# Patient Record
Sex: MALE | Race: ASIAN | NOT HISPANIC OR LATINO
[De-identification: names, ages, dates, MRNs, and addresses within clinical notes are randomized per-mention and may not be internally consistent; named-entity substitution may affect disease eponyms.]

---

## 2017-11-01 ENCOUNTER — TRANSCRIPTION ENCOUNTER (OUTPATIENT)
Age: 62
End: 2017-11-01

## 2017-11-01 ENCOUNTER — INPATIENT (INPATIENT)
Facility: HOSPITAL | Age: 62
LOS: 2 days | Discharge: HOME | End: 2017-11-04
Attending: UROLOGY | Admitting: UROLOGY
Payer: COMMERCIAL

## 2017-11-01 VITALS
OXYGEN SATURATION: 97 % | HEART RATE: 108 BPM | RESPIRATION RATE: 18 BRPM | SYSTOLIC BLOOD PRESSURE: 110 MMHG | TEMPERATURE: 103 F | WEIGHT: 162.26 LBS | DIASTOLIC BLOOD PRESSURE: 58 MMHG

## 2017-11-01 DIAGNOSIS — Z98.890 OTHER SPECIFIED POSTPROCEDURAL STATES: Chronic | ICD-10-CM

## 2017-11-01 DIAGNOSIS — A41.9 SEPSIS, UNSPECIFIED ORGANISM: ICD-10-CM

## 2017-11-01 DIAGNOSIS — Z90.49 ACQUIRED ABSENCE OF OTHER SPECIFIED PARTS OF DIGESTIVE TRACT: Chronic | ICD-10-CM

## 2017-11-01 LAB
ALBUMIN SERPL ELPH-MCNC: 3.3 G/DL — SIGNIFICANT CHANGE UP (ref 3.3–5)
ALP SERPL-CCNC: 96 U/L — SIGNIFICANT CHANGE UP (ref 40–120)
ALT FLD-CCNC: 23 U/L — SIGNIFICANT CHANGE UP (ref 4–41)
APPEARANCE UR: SIGNIFICANT CHANGE UP
APTT BLD: 26.1 SEC — LOW (ref 27.5–37.4)
AST SERPL-CCNC: 19 U/L — SIGNIFICANT CHANGE UP (ref 4–40)
BACTERIA # UR AUTO: SIGNIFICANT CHANGE UP
BASE EXCESS BLDV CALC-SCNC: -2.4 MMOL/L — SIGNIFICANT CHANGE UP
BASE EXCESS BLDV CALC-SCNC: -3.4 MMOL/L — SIGNIFICANT CHANGE UP
BASOPHILS # BLD AUTO: 0.02 K/UL — SIGNIFICANT CHANGE UP (ref 0–0.2)
BASOPHILS NFR BLD AUTO: 0.2 % — SIGNIFICANT CHANGE UP (ref 0–2)
BASOPHILS NFR SPEC: 0 % — SIGNIFICANT CHANGE UP (ref 0–2)
BILIRUB SERPL-MCNC: 0.4 MG/DL — SIGNIFICANT CHANGE UP (ref 0.2–1.2)
BILIRUB UR-MCNC: NEGATIVE — SIGNIFICANT CHANGE UP
BLD GP AB SCN SERPL QL: NEGATIVE — SIGNIFICANT CHANGE UP
BLOOD GAS VENOUS - CREATININE: 1.72 MG/DL — HIGH (ref 0.5–1.3)
BLOOD GAS VENOUS - CREATININE: 1.73 MG/DL — HIGH (ref 0.5–1.3)
BLOOD UR QL VISUAL: HIGH
BUN SERPL-MCNC: 40 MG/DL — HIGH (ref 7–23)
CALCIUM SERPL-MCNC: 8.5 MG/DL — SIGNIFICANT CHANGE UP (ref 8.4–10.5)
CHLORIDE BLDV-SCNC: 108 MMOL/L — SIGNIFICANT CHANGE UP (ref 96–108)
CHLORIDE BLDV-SCNC: 111 MMOL/L — HIGH (ref 96–108)
CHLORIDE SERPL-SCNC: 103 MMOL/L — SIGNIFICANT CHANGE UP (ref 98–107)
CO2 SERPL-SCNC: 20 MMOL/L — LOW (ref 22–31)
COLOR SPEC: YELLOW — SIGNIFICANT CHANGE UP
CREAT SERPL-MCNC: 1.77 MG/DL — HIGH (ref 0.5–1.3)
EOSINOPHIL # BLD AUTO: 0 K/UL — SIGNIFICANT CHANGE UP (ref 0–0.5)
EOSINOPHIL NFR BLD AUTO: 0 % — SIGNIFICANT CHANGE UP (ref 0–6)
EOSINOPHIL NFR FLD: 0 % — SIGNIFICANT CHANGE UP (ref 0–6)
GAS PNL BLDV: 136 MMOL/L — SIGNIFICANT CHANGE UP (ref 136–146)
GAS PNL BLDV: 136 MMOL/L — SIGNIFICANT CHANGE UP (ref 136–146)
GLUCOSE BLDV-MCNC: 101 — HIGH (ref 70–99)
GLUCOSE BLDV-MCNC: 134 — HIGH (ref 70–99)
GLUCOSE SERPL-MCNC: 126 MG/DL — HIGH (ref 70–99)
GLUCOSE UR-MCNC: 100 — SIGNIFICANT CHANGE UP
HCO3 BLDV-SCNC: 21 MMOL/L — SIGNIFICANT CHANGE UP (ref 20–27)
HCO3 BLDV-SCNC: 23 MMOL/L — SIGNIFICANT CHANGE UP (ref 20–27)
HCT VFR BLD CALC: 39.2 % — SIGNIFICANT CHANGE UP (ref 39–50)
HCT VFR BLDV CALC: 35.7 % — LOW (ref 39–51)
HCT VFR BLDV CALC: 41.8 % — SIGNIFICANT CHANGE UP (ref 39–51)
HGB BLD-MCNC: 13.4 G/DL — SIGNIFICANT CHANGE UP (ref 13–17)
HGB BLDV-MCNC: 11.6 G/DL — LOW (ref 13–17)
HGB BLDV-MCNC: 13.6 G/DL — SIGNIFICANT CHANGE UP (ref 13–17)
IMM GRANULOCYTES # BLD AUTO: 0.1 # — SIGNIFICANT CHANGE UP
IMM GRANULOCYTES NFR BLD AUTO: 1.2 % — SIGNIFICANT CHANGE UP (ref 0–1.5)
INR BLD: 1.2 — HIGH (ref 0.88–1.17)
KETONES UR-MCNC: NEGATIVE — SIGNIFICANT CHANGE UP
LACTATE BLDV-MCNC: 2.4 MMOL/L — HIGH (ref 0.5–2)
LACTATE BLDV-MCNC: 2.8 MMOL/L — HIGH (ref 0.5–2)
LEUKOCYTE ESTERASE UR-ACNC: HIGH
LYMPHOCYTES # BLD AUTO: 0.27 K/UL — LOW (ref 1–3.3)
LYMPHOCYTES # BLD AUTO: 3.4 % — LOW (ref 13–44)
LYMPHOCYTES NFR SPEC AUTO: 3.5 % — LOW (ref 13–44)
MCHC RBC-ENTMCNC: 30.7 PG — SIGNIFICANT CHANGE UP (ref 27–34)
MCHC RBC-ENTMCNC: 34.2 % — SIGNIFICANT CHANGE UP (ref 32–36)
MCV RBC AUTO: 89.7 FL — SIGNIFICANT CHANGE UP (ref 80–100)
METAMYELOCYTES # FLD: 3.5 % — HIGH (ref 0–1)
MONOCYTES # BLD AUTO: 0.14 K/UL — SIGNIFICANT CHANGE UP (ref 0–0.9)
MONOCYTES NFR BLD AUTO: 1.7 % — LOW (ref 2–14)
MONOCYTES NFR BLD: 5.3 % — SIGNIFICANT CHANGE UP (ref 2–9)
MORPHOLOGY BLD-IMP: NORMAL — SIGNIFICANT CHANGE UP
MUCOUS THREADS # UR AUTO: SIGNIFICANT CHANGE UP
MYELOCYTES NFR BLD: 0.9 % — HIGH (ref 0–0)
NEUTROPHIL AB SER-ACNC: 67.5 % — SIGNIFICANT CHANGE UP (ref 43–77)
NEUTROPHILS # BLD AUTO: 7.49 K/UL — HIGH (ref 1.8–7.4)
NEUTROPHILS NFR BLD AUTO: 93.5 % — HIGH (ref 43–77)
NEUTS BAND # BLD: 17.5 % — HIGH (ref 0–6)
NITRITE UR-MCNC: NEGATIVE — SIGNIFICANT CHANGE UP
NRBC # FLD: 0 — SIGNIFICANT CHANGE UP
PCO2 BLDV: 30 MMHG — LOW (ref 41–51)
PCO2 BLDV: 39 MMHG — LOW (ref 41–51)
PH BLDV: 7.35 PH — SIGNIFICANT CHANGE UP (ref 7.32–7.43)
PH BLDV: 7.46 PH — HIGH (ref 7.32–7.43)
PH UR: 6 — SIGNIFICANT CHANGE UP (ref 4.6–8)
PLATELET # BLD AUTO: 132 K/UL — LOW (ref 150–400)
PLATELET COUNT - ESTIMATE: SIGNIFICANT CHANGE UP
PMV BLD: 10.4 FL — SIGNIFICANT CHANGE UP (ref 7–13)
PO2 BLDV: 27 MMHG — LOW (ref 35–40)
PO2 BLDV: 47 MMHG — HIGH (ref 35–40)
POTASSIUM BLDV-SCNC: 3 MMOL/L — LOW (ref 3.4–4.5)
POTASSIUM BLDV-SCNC: 3.3 MMOL/L — LOW (ref 3.4–4.5)
POTASSIUM SERPL-MCNC: 3 MMOL/L — LOW (ref 3.5–5.3)
POTASSIUM SERPL-SCNC: 3 MMOL/L — LOW (ref 3.5–5.3)
PROT SERPL-MCNC: 6.6 G/DL — SIGNIFICANT CHANGE UP (ref 6–8.3)
PROT UR-MCNC: 100 — SIGNIFICANT CHANGE UP
PROTHROM AB SERPL-ACNC: 13.5 SEC — HIGH (ref 9.8–13.1)
RBC # BLD: 4.37 M/UL — SIGNIFICANT CHANGE UP (ref 4.2–5.8)
RBC # FLD: 12.4 % — SIGNIFICANT CHANGE UP (ref 10.3–14.5)
RBC CASTS # UR COMP ASSIST: SIGNIFICANT CHANGE UP (ref 0–?)
REVIEW TO FOLLOW: YES — SIGNIFICANT CHANGE UP
RH IG SCN BLD-IMP: POSITIVE — SIGNIFICANT CHANGE UP
RH IG SCN BLD-IMP: POSITIVE — SIGNIFICANT CHANGE UP
SAO2 % BLDV: 44.2 % — LOW (ref 60–85)
SAO2 % BLDV: 83.6 % — SIGNIFICANT CHANGE UP (ref 60–85)
SODIUM SERPL-SCNC: 140 MMOL/L — SIGNIFICANT CHANGE UP (ref 135–145)
SP GR SPEC: 1.01 — SIGNIFICANT CHANGE UP (ref 1–1.03)
UROBILINOGEN FLD QL: NORMAL E.U. — SIGNIFICANT CHANGE UP (ref 0.1–0.2)
VARIANT LYMPHS # BLD: 1.8 % — SIGNIFICANT CHANGE UP
WBC # BLD: 8.02 K/UL — SIGNIFICANT CHANGE UP (ref 3.8–10.5)
WBC # FLD AUTO: 8.02 K/UL — SIGNIFICANT CHANGE UP (ref 3.8–10.5)
WBC CLUMPS #/AREA URNS HPF: PRESENT — HIGH (ref 0–?)
WBC UR QL: >50 — HIGH (ref 0–?)

## 2017-11-01 PROCEDURE — 74176 CT ABD & PELVIS W/O CONTRAST: CPT | Mod: 26

## 2017-11-01 PROCEDURE — 71020: CPT | Mod: 26

## 2017-11-01 RX ORDER — SODIUM CHLORIDE 9 MG/ML
1000 INJECTION INTRAMUSCULAR; INTRAVENOUS; SUBCUTANEOUS ONCE
Qty: 0 | Refills: 0 | Status: COMPLETED | OUTPATIENT
Start: 2017-11-01 | End: 2017-11-01

## 2017-11-01 RX ORDER — PIPERACILLIN AND TAZOBACTAM 4; .5 G/20ML; G/20ML
3.38 INJECTION, POWDER, LYOPHILIZED, FOR SOLUTION INTRAVENOUS ONCE
Qty: 0 | Refills: 0 | Status: COMPLETED | OUTPATIENT
Start: 2017-11-01 | End: 2017-11-01

## 2017-11-01 RX ORDER — CEFTRIAXONE 500 MG/1
2 INJECTION, POWDER, FOR SOLUTION INTRAMUSCULAR; INTRAVENOUS ONCE
Qty: 0 | Refills: 0 | Status: COMPLETED | OUTPATIENT
Start: 2017-11-01 | End: 2017-11-01

## 2017-11-01 RX ORDER — ACETAMINOPHEN 500 MG
1000 TABLET ORAL ONCE
Qty: 0 | Refills: 0 | Status: COMPLETED | OUTPATIENT
Start: 2017-11-01 | End: 2017-11-01

## 2017-11-01 RX ORDER — SODIUM CHLORIDE 9 MG/ML
2000 INJECTION INTRAMUSCULAR; INTRAVENOUS; SUBCUTANEOUS ONCE
Qty: 0 | Refills: 0 | Status: COMPLETED | OUTPATIENT
Start: 2017-11-01 | End: 2017-11-01

## 2017-11-01 RX ORDER — NOREPINEPHRINE BITARTRATE/D5W 8 MG/250ML
0.1 PLASTIC BAG, INJECTION (ML) INTRAVENOUS
Qty: 8 | Refills: 0 | Status: DISCONTINUED | OUTPATIENT
Start: 2017-11-01 | End: 2017-11-02

## 2017-11-01 RX ORDER — MIDODRINE HYDROCHLORIDE 2.5 MG/1
20 TABLET ORAL ONCE
Qty: 0 | Refills: 0 | Status: COMPLETED | OUTPATIENT
Start: 2017-11-01 | End: 2017-11-01

## 2017-11-01 RX ORDER — SODIUM CHLORIDE 9 MG/ML
1000 INJECTION INTRAMUSCULAR; INTRAVENOUS; SUBCUTANEOUS
Qty: 0 | Refills: 0 | Status: DISCONTINUED | OUTPATIENT
Start: 2017-11-01 | End: 2017-11-02

## 2017-11-01 RX ORDER — POTASSIUM CHLORIDE 20 MEQ
10 PACKET (EA) ORAL
Qty: 0 | Refills: 0 | Status: COMPLETED | OUTPATIENT
Start: 2017-11-01 | End: 2017-11-01

## 2017-11-01 RX ORDER — POTASSIUM CHLORIDE 20 MEQ
40 PACKET (EA) ORAL ONCE
Qty: 0 | Refills: 0 | Status: COMPLETED | OUTPATIENT
Start: 2017-11-01 | End: 2017-11-01

## 2017-11-01 RX ADMIN — Medication 1000 MILLIGRAM(S): at 18:36

## 2017-11-01 RX ADMIN — CEFTRIAXONE 100 GRAM(S): 500 INJECTION, POWDER, FOR SOLUTION INTRAMUSCULAR; INTRAVENOUS at 17:50

## 2017-11-01 RX ADMIN — SODIUM CHLORIDE 1000 MILLILITER(S): 9 INJECTION INTRAMUSCULAR; INTRAVENOUS; SUBCUTANEOUS at 18:36

## 2017-11-01 RX ADMIN — Medication 400 MILLIGRAM(S): at 17:50

## 2017-11-01 RX ADMIN — SODIUM CHLORIDE 2000 MILLILITER(S): 9 INJECTION INTRAMUSCULAR; INTRAVENOUS; SUBCUTANEOUS at 17:30

## 2017-11-01 RX ADMIN — SODIUM CHLORIDE 1000 MILLILITER(S): 9 INJECTION INTRAMUSCULAR; INTRAVENOUS; SUBCUTANEOUS at 19:39

## 2017-11-01 RX ADMIN — Medication 100 MILLIEQUIVALENT(S): at 22:02

## 2017-11-01 RX ADMIN — Medication 100 MILLIEQUIVALENT(S): at 19:30

## 2017-11-01 RX ADMIN — PIPERACILLIN AND TAZOBACTAM 200 GRAM(S): 4; .5 INJECTION, POWDER, LYOPHILIZED, FOR SOLUTION INTRAVENOUS at 22:34

## 2017-11-01 RX ADMIN — Medication 13.8 MICROGRAM(S)/KG/MIN: at 20:22

## 2017-11-01 RX ADMIN — Medication 100 MILLIEQUIVALENT(S): at 20:36

## 2017-11-01 RX ADMIN — MIDODRINE HYDROCHLORIDE 20 MILLIGRAM(S): 2.5 TABLET ORAL at 23:04

## 2017-11-01 RX ADMIN — Medication 40 MILLIEQUIVALENT(S): at 19:30

## 2017-11-01 NOTE — ED PROVIDER NOTE - MEDICAL DECISION MAKING DETAILS
60 y/o M here w/ sepsis likely 2/2 UTI given foul smelling urine. Due to hx of renal stones, will do ct abdomen to R/O infected stone. 60 y/o M here w/ sepsis likely 2/2 UTI given foul smelling urine. Due to hx of renal stones, will do ct abdomen to R/O infected stone.    Cabot: 61M with PMH of HTN and DM here with 2 weeks of malaise, R flank pain, and foul smelling urine.  Febrile to 103 here, now with hypotension.  Exam showing clear lungs, benign abd, + R CVAT.  R hydro on US.  DDx includes infected stone vs pyelo.  Has received abx, fluids, now starting pressors.  Will consult the MICU.  Pending CT A/P.

## 2017-11-01 NOTE — H&P ADULT - NSHPPHYSICALEXAM_GEN_ALL_CORE
Gen: AAOx3, ill-appearing  Lungs: CTA b/l. No wheezing or rales  CV: S1, S2, RRR		  Abd: soft, +BS, NT/ND. No CVAT b/l  : uncirc phallus, b/l descended testes-nontender. +16-f benjamin catheter in place- draining yellow, concentrated urine  Ext: no edema or cyanosis b/l.

## 2017-11-01 NOTE — ED PROVIDER NOTE - ATTENDING CONTRIBUTION TO CARE
I, Jennifer Cabot, MD, have performed a history and physical exam of the patient and discussed their management with the resident. I reviewed the resident's note and agree with the documented findings and plan of care. My medical decision making and observations are found above.    Cabot: 61M with PMH of HTN and DM here with 2 weeks of malaise, R flank pain, and foul smelling urine.  Febrile to 103 here, now with hypotension.  Exam showing clear lungs, benign abd, + R CVAT.  R hydro on US.  DDx includes infected stone vs pyelo.  Has received abx, fluids, now starting pressors.  Will consult the MICU.  Pending CT A/P. I, Jennifer Cabot, MD, have performed a history and physical exam of the patient and discussed their management with the resident. I reviewed the ACP's note and agree with the documented findings and plan of care. My medical decision making and observations are found above.    Cabot: 61M with PMH of HTN and DM here with 2 weeks of malaise, R flank pain, and foul smelling urine.  Febrile to 103 here, now with hypotension.  Exam showing clear lungs, benign abd, + R CVAT.  R hydro on US.  DDx includes infected stone vs pyelo.  Has received abx, fluids, now starting pressors.  Will consult the MICU.  Pending CT A/P.

## 2017-11-01 NOTE — ED PROVIDER NOTE - PROGRESS NOTE DETAILS
YASMIN Lau: Bedside US done showing mild R hydronephrosis, pending CT abdomen. Honobia: pt hypotensive after 5 L fluids, started on levophed awaiting CT r/o stone, if no stone will call MICU for eval or uro/sicu seen by URO, will do perc no stent , micu consult

## 2017-11-01 NOTE — H&P ADULT - ASSESSMENT
60yo M with h/o HTN, DM with R 4mm UVJ calculus w/ mild hydronephrosis, Urosepsis, Febrile UTI, hypotensive requiring pressors  Febrile UTI along with an obstructing stone requiring pressors  - will need an emergent cystoscopy, Right stent placement to decompress the kidney  - will obtain consent  - IV Fluid resuscitation  - SICU consult  - Discussed with Dr. Moreno, Urology Attending

## 2017-11-01 NOTE — ED ADULT TRIAGE NOTE - CHIEF COMPLAINT QUOTE
patient  came in c/o fever cloudy and stinky urine and lower back pain with fever since almost a week." iv normal bolus with 16 angiocath  iv by EMS . on arrival on the scene, EMS found the patient lethargic and tachycardic.

## 2017-11-01 NOTE — ED PROCEDURE NOTE - ATTENDING CONTRIBUTION TO CARE
I was physically present for the E/M service provided. I was physically present for the key portions of the service provided. PADMINI.

## 2017-11-01 NOTE — H&P ADULT - HISTORY OF PRESENT ILLNESS
62yo M with PMHx of HTN, Type II DM, and kidney stones who presents c/o generalized malaise and weakness x2 days.  He states he had some mild right flank pain that lasted a couple of minutes yesterday, however, pain similar to that of passing a kidney stone and presently denies abdominal pain.  Shortly after, developed a fever along with chills and rigors. Of note, also c/o foul smelly urine for the past couple of days. Denies urethral discharge, hematuria, nausea and vomiting, URI symptoms.  On arrival to the ED, 1L of NS bolus given by EMT, shortly after 4L bolus in total for hypotension, SBP in 80’s, HR ’s with no response to bolus, started on Levophed. Given 2gm of Ceftriaxone and Tylenol.

## 2017-11-01 NOTE — H&P ADULT - NSHPLABSRESULTS_GEN_ALL_CORE
2017 17:00    140    |  103    |  40     ----------------------------<  126    3.0     |  20     |  1.77     Ca    8.5        2017 17:00                            13.4   8.02  )-----------( 132      ( 2017 17:00 )             39.2     Urinalysis Basic - ( 2017 17:50 )    Color: YELLOW / Appearance: HAZY / S.015 / pH: 6.0  Gluc: 100 / Ketone: NEGATIVE  / Bili: NEGATIVE / Urobili: NORMAL E.U.   Blood: MODERATE / Protein: 100 / Nitrite: NEGATIVE   Leuk Esterase: LARGE / RBC: 2-5 / WBC >50   Sq Epi: x / Non Sq Epi: x / Bacteria: FEW     Urine Cx: Pending   Blood Cx: Pending

## 2017-11-01 NOTE — ED PROVIDER NOTE - OBJECTIVE STATEMENT
62 y/o M PMHx HTN and DM here w/ ~2 weeks of generalized malaise & 2 days of rigors/subjective fevers w/ R flank pain. Pt also c/o associated foul smelling urine. No cough or URI sx. No abdominal pain at present. Did not take any antipyretics prior to arrival. Denies N/V, chest pain, SOB, hematuria or any other complaints.

## 2017-11-01 NOTE — H&P ADULT - NSHPREVIEWOFSYSTEMS_GEN_ALL_CORE
Constitutional: +fever, chills, rigors  Respiratory: negative  Cardiovascular: negative  Gastroenterology: Right abdominal pain. No nausea or vomiting  Genitourinary: foul-smelling urine, +dysuria. No bloody urine.  Neurology: negative  MSK: negative

## 2017-11-01 NOTE — ED PROCEDURE NOTE - PROCEDURE ADDITIONAL DETAILS
Focused ED Sonogram: Retroperitoneal and Bladder  Right Kidney: Hydronephrosis: Mild, small simple cyst  Left Kidney: Hydronephrosis: None  Bladder: 14 cc post-void    Impression: Mild right hydronephrosis. no urinary retention  Attending: Chrissie Monzon DO  [id number]

## 2017-11-01 NOTE — ED ADULT NURSE NOTE - OBJECTIVE STATEMENT
pt presents to ED pmh of kidney stones. pt reports several days of chills but unknown if he had a fever. pt reprots right flank pain , dysuria and foul smelling urine. pt febrile at triage, code sepsis called upon arrival to ED room. NS infusing from EMS, 2nd IV established 20 g RAC. Labs sent tQ removed. + right CVA tenderness, neg abd pain on palpation, denies CP SOB or BENJAMIN. CCM in place will CTM.

## 2017-11-02 DIAGNOSIS — N20.0 CALCULUS OF KIDNEY: ICD-10-CM

## 2017-11-02 LAB
APTT BLD: 26.5 SEC — LOW (ref 27.5–37.4)
BASE EXCESS BLDA CALC-SCNC: -10.6 MMOL/L — SIGNIFICANT CHANGE UP
BASE EXCESS BLDA CALC-SCNC: -8.2 MMOL/L — SIGNIFICANT CHANGE UP
BASE EXCESS BLDA CALC-SCNC: -9.7 MMOL/L — SIGNIFICANT CHANGE UP
BASOPHILS # BLD AUTO: 0.03 K/UL — SIGNIFICANT CHANGE UP (ref 0–0.2)
BASOPHILS NFR BLD AUTO: 0.2 % — SIGNIFICANT CHANGE UP (ref 0–2)
BASOPHILS NFR SPEC: 0 % — SIGNIFICANT CHANGE UP (ref 0–2)
BUN SERPL-MCNC: 36 MG/DL — HIGH (ref 7–23)
BUN SERPL-MCNC: 39 MG/DL — HIGH (ref 7–23)
CA-I BLDA-SCNC: 1.15 MMOL/L — SIGNIFICANT CHANGE UP (ref 1.15–1.29)
CA-I BLDA-SCNC: 1.15 MMOL/L — SIGNIFICANT CHANGE UP (ref 1.15–1.29)
CA-I BLDA-SCNC: 1.17 MMOL/L — SIGNIFICANT CHANGE UP (ref 1.15–1.29)
CALCIUM SERPL-MCNC: 7.5 MG/DL — LOW (ref 8.4–10.5)
CALCIUM SERPL-MCNC: 7.8 MG/DL — LOW (ref 8.4–10.5)
CHLORIDE SERPL-SCNC: 107 MMOL/L — SIGNIFICANT CHANGE UP (ref 98–107)
CHLORIDE SERPL-SCNC: 108 MMOL/L — HIGH (ref 98–107)
CO2 SERPL-SCNC: 14 MMOL/L — LOW (ref 22–31)
CO2 SERPL-SCNC: 15 MMOL/L — LOW (ref 22–31)
CREAT SERPL-MCNC: 1.6 MG/DL — HIGH (ref 0.5–1.3)
CREAT SERPL-MCNC: 1.61 MG/DL — HIGH (ref 0.5–1.3)
EOSINOPHIL # BLD AUTO: 0 K/UL — SIGNIFICANT CHANGE UP (ref 0–0.5)
EOSINOPHIL NFR BLD AUTO: 0 % — SIGNIFICANT CHANGE UP (ref 0–6)
EOSINOPHIL NFR FLD: 0 % — SIGNIFICANT CHANGE UP (ref 0–6)
GLUCOSE BLDA-MCNC: 190 MG/DL — HIGH (ref 70–99)
GLUCOSE BLDA-MCNC: 203 MG/DL — HIGH (ref 70–99)
GLUCOSE BLDA-MCNC: 222 MG/DL — HIGH (ref 70–99)
GLUCOSE BLDC GLUCOMTR-MCNC: 168 MG/DL — HIGH (ref 70–99)
GLUCOSE BLDC GLUCOMTR-MCNC: 175 MG/DL — HIGH (ref 70–99)
GLUCOSE BLDC GLUCOMTR-MCNC: 213 MG/DL — HIGH (ref 70–99)
GLUCOSE SERPL-MCNC: 199 MG/DL — HIGH (ref 70–99)
GLUCOSE SERPL-MCNC: 221 MG/DL — HIGH (ref 70–99)
HCO3 BLDA-SCNC: 16 MMOL/L — LOW (ref 22–26)
HCO3 BLDA-SCNC: 17 MMOL/L — LOW (ref 22–26)
HCO3 BLDA-SCNC: 18 MMOL/L — LOW (ref 22–26)
HCT VFR BLD CALC: 35.3 % — LOW (ref 39–50)
HCT VFR BLDA CALC: 36.6 % — LOW (ref 39–51)
HCT VFR BLDA CALC: 37.7 % — LOW (ref 39–51)
HCT VFR BLDA CALC: 37.8 % — LOW (ref 39–51)
HGB BLD-MCNC: 11.9 G/DL — LOW (ref 13–17)
HGB BLDA-MCNC: 11.9 G/DL — LOW (ref 13–17)
HGB BLDA-MCNC: 12.3 G/DL — LOW (ref 13–17)
HGB BLDA-MCNC: 12.3 G/DL — LOW (ref 13–17)
IMM GRANULOCYTES # BLD AUTO: 1.05 # — SIGNIFICANT CHANGE UP
IMM GRANULOCYTES NFR BLD AUTO: 5.6 % — HIGH (ref 0–1.5)
INR BLD: 1.18 — HIGH (ref 0.88–1.17)
LACTATE BLDA-SCNC: 1.7 MMOL/L — SIGNIFICANT CHANGE UP (ref 0.5–2)
LACTATE BLDA-SCNC: 1.8 MMOL/L — SIGNIFICANT CHANGE UP (ref 0.5–2)
LACTATE BLDA-SCNC: 2.1 MMOL/L — HIGH (ref 0.5–2)
LYMPHOCYTES # BLD AUTO: 0.49 K/UL — LOW (ref 1–3.3)
LYMPHOCYTES # BLD AUTO: 2.6 % — LOW (ref 13–44)
LYMPHOCYTES NFR SPEC AUTO: 0.9 % — LOW (ref 13–44)
MAGNESIUM SERPL-MCNC: 1.3 MG/DL — LOW (ref 1.6–2.6)
MAGNESIUM SERPL-MCNC: 2.3 MG/DL — SIGNIFICANT CHANGE UP (ref 1.6–2.6)
MCHC RBC-ENTMCNC: 30.8 PG — SIGNIFICANT CHANGE UP (ref 27–34)
MCHC RBC-ENTMCNC: 33.7 % — SIGNIFICANT CHANGE UP (ref 32–36)
MCV RBC AUTO: 91.5 FL — SIGNIFICANT CHANGE UP (ref 80–100)
METHOD TYPE: SIGNIFICANT CHANGE UP
MONOCYTES # BLD AUTO: 0.35 K/UL — SIGNIFICANT CHANGE UP (ref 0–0.9)
MONOCYTES NFR BLD AUTO: 1.9 % — LOW (ref 2–14)
MONOCYTES NFR BLD: 4.4 % — SIGNIFICANT CHANGE UP (ref 2–9)
MORPHOLOGY BLD-IMP: NORMAL — SIGNIFICANT CHANGE UP
NEUTROPHIL AB SER-ACNC: 72.8 % — SIGNIFICANT CHANGE UP (ref 43–77)
NEUTROPHILS # BLD AUTO: 16.82 K/UL — HIGH (ref 1.8–7.4)
NEUTROPHILS NFR BLD AUTO: 89.7 % — HIGH (ref 43–77)
NEUTS BAND # BLD: 21.9 % — HIGH (ref 0–6)
NRBC # FLD: 0 — SIGNIFICANT CHANGE UP
ORGANISM # SPEC MICROSCOPIC CNT: SIGNIFICANT CHANGE UP
ORGANISM # SPEC MICROSCOPIC CNT: SIGNIFICANT CHANGE UP
PCO2 BLDA: 29 MMHG — LOW (ref 35–48)
PCO2 BLDA: 31 MMHG — LOW (ref 35–48)
PCO2 BLDA: 32 MMHG — LOW (ref 35–48)
PH BLDA: 7.29 PH — LOW (ref 7.35–7.45)
PH BLDA: 7.31 PH — LOW (ref 7.35–7.45)
PH BLDA: 7.37 PH — SIGNIFICANT CHANGE UP (ref 7.35–7.45)
PHOSPHATE SERPL-MCNC: 3.7 MG/DL — SIGNIFICANT CHANGE UP (ref 2.5–4.5)
PHOSPHATE SERPL-MCNC: 4.4 MG/DL — SIGNIFICANT CHANGE UP (ref 2.5–4.5)
PLATELET # BLD AUTO: 118 K/UL — LOW (ref 150–400)
PLATELET COUNT - ESTIMATE: SIGNIFICANT CHANGE UP
PMV BLD: 10.8 FL — SIGNIFICANT CHANGE UP (ref 7–13)
PO2 BLDA: 110 MMHG — HIGH (ref 83–108)
PO2 BLDA: 172 MMHG — HIGH (ref 83–108)
PO2 BLDA: 205 MMHG — HIGH (ref 83–108)
POTASSIUM BLDA-SCNC: 4.4 MMOL/L — SIGNIFICANT CHANGE UP (ref 3.4–4.5)
POTASSIUM BLDA-SCNC: 4.6 MMOL/L — HIGH (ref 3.4–4.5)
POTASSIUM BLDA-SCNC: 5 MMOL/L — HIGH (ref 3.4–4.5)
POTASSIUM SERPL-MCNC: 4.9 MMOL/L — SIGNIFICANT CHANGE UP (ref 3.5–5.3)
POTASSIUM SERPL-MCNC: 5.4 MMOL/L — HIGH (ref 3.5–5.3)
POTASSIUM SERPL-SCNC: 4.9 MMOL/L — SIGNIFICANT CHANGE UP (ref 3.5–5.3)
POTASSIUM SERPL-SCNC: 5.4 MMOL/L — HIGH (ref 3.5–5.3)
PROTHROM AB SERPL-ACNC: 13.3 SEC — HIGH (ref 9.8–13.1)
RBC # BLD: 3.86 M/UL — LOW (ref 4.2–5.8)
RBC # FLD: 13.2 % — SIGNIFICANT CHANGE UP (ref 10.3–14.5)
SAO2 % BLDA: 97.5 % — SIGNIFICANT CHANGE UP (ref 95–99)
SAO2 % BLDA: 98.8 % — SIGNIFICANT CHANGE UP (ref 95–99)
SAO2 % BLDA: 98.9 % — SIGNIFICANT CHANGE UP (ref 95–99)
SODIUM BLDA-SCNC: 134 MMOL/L — LOW (ref 136–146)
SODIUM BLDA-SCNC: 136 MMOL/L — SIGNIFICANT CHANGE UP (ref 136–146)
SODIUM BLDA-SCNC: 138 MMOL/L — SIGNIFICANT CHANGE UP (ref 136–146)
SODIUM SERPL-SCNC: 138 MMOL/L — SIGNIFICANT CHANGE UP (ref 135–145)
SODIUM SERPL-SCNC: 139 MMOL/L — SIGNIFICANT CHANGE UP (ref 135–145)
SPECIMEN SOURCE: SIGNIFICANT CHANGE UP
SPECIMEN SOURCE: SIGNIFICANT CHANGE UP
WBC # BLD: 18.74 K/UL — HIGH (ref 3.8–10.5)
WBC # FLD AUTO: 18.74 K/UL — HIGH (ref 3.8–10.5)

## 2017-11-02 PROCEDURE — 71010: CPT | Mod: 26

## 2017-11-02 PROCEDURE — 52332 CYSTOSCOPY AND TREATMENT: CPT | Mod: RT

## 2017-11-02 PROCEDURE — 99221 1ST HOSP IP/OBS SF/LOW 40: CPT | Mod: 25

## 2017-11-02 RX ORDER — CEFTRIAXONE 500 MG/1
2 INJECTION, POWDER, FOR SOLUTION INTRAMUSCULAR; INTRAVENOUS EVERY 24 HOURS
Qty: 0 | Refills: 0 | Status: CANCELLED | OUTPATIENT
Start: 2017-11-03 | End: 2017-11-04

## 2017-11-02 RX ORDER — CEFTRIAXONE 500 MG/1
2 INJECTION, POWDER, FOR SOLUTION INTRAMUSCULAR; INTRAVENOUS ONCE
Qty: 0 | Refills: 0 | Status: COMPLETED | OUTPATIENT
Start: 2017-11-02 | End: 2017-11-02

## 2017-11-02 RX ORDER — DEXTROSE 50 % IN WATER 50 %
25 SYRINGE (ML) INTRAVENOUS ONCE
Qty: 0 | Refills: 0 | Status: DISCONTINUED | OUTPATIENT
Start: 2017-11-02 | End: 2017-11-02

## 2017-11-02 RX ORDER — FENTANYL CITRATE 50 UG/ML
1 INJECTION INTRAVENOUS
Qty: 2500 | Refills: 0 | Status: DISCONTINUED | OUTPATIENT
Start: 2017-11-02 | End: 2017-11-02

## 2017-11-02 RX ORDER — INSULIN LISPRO 100/ML
VIAL (ML) SUBCUTANEOUS EVERY 6 HOURS
Qty: 0 | Refills: 0 | Status: DISCONTINUED | OUTPATIENT
Start: 2017-11-02 | End: 2017-11-03

## 2017-11-02 RX ORDER — PROPOFOL 10 MG/ML
50 INJECTION, EMULSION INTRAVENOUS
Qty: 1000 | Refills: 0 | Status: DISCONTINUED | OUTPATIENT
Start: 2017-11-02 | End: 2017-11-02

## 2017-11-02 RX ORDER — GLUCAGON INJECTION, SOLUTION 0.5 MG/.1ML
1 INJECTION, SOLUTION SUBCUTANEOUS ONCE
Qty: 0 | Refills: 0 | Status: DISCONTINUED | OUTPATIENT
Start: 2017-11-02 | End: 2017-11-02

## 2017-11-02 RX ORDER — SODIUM CHLORIDE 9 MG/ML
1000 INJECTION, SOLUTION INTRAVENOUS
Qty: 0 | Refills: 0 | Status: DISCONTINUED | OUTPATIENT
Start: 2017-11-02 | End: 2017-11-03

## 2017-11-02 RX ORDER — MAGNESIUM SULFATE 500 MG/ML
2 VIAL (ML) INJECTION ONCE
Qty: 0 | Refills: 0 | Status: COMPLETED | OUTPATIENT
Start: 2017-11-02 | End: 2017-11-02

## 2017-11-02 RX ORDER — CEFTRIAXONE 500 MG/1
INJECTION, POWDER, FOR SOLUTION INTRAMUSCULAR; INTRAVENOUS
Qty: 0 | Refills: 0 | Status: DISCONTINUED | OUTPATIENT
Start: 2017-11-02 | End: 2017-11-04

## 2017-11-02 RX ORDER — HEPARIN SODIUM 5000 [USP'U]/ML
5000 INJECTION INTRAVENOUS; SUBCUTANEOUS EVERY 8 HOURS
Qty: 0 | Refills: 0 | Status: DISCONTINUED | OUTPATIENT
Start: 2017-11-02 | End: 2017-11-04

## 2017-11-02 RX ADMIN — HEPARIN SODIUM 5000 UNIT(S): 5000 INJECTION INTRAVENOUS; SUBCUTANEOUS at 21:39

## 2017-11-02 RX ADMIN — Medication 2: at 11:06

## 2017-11-02 RX ADMIN — CEFTRIAXONE 100 GRAM(S): 500 INJECTION, POWDER, FOR SOLUTION INTRAMUSCULAR; INTRAVENOUS at 06:12

## 2017-11-02 RX ADMIN — PROPOFOL 22.08 MICROGRAM(S)/KG/MIN: 10 INJECTION, EMULSION INTRAVENOUS at 13:20

## 2017-11-02 RX ADMIN — FENTANYL CITRATE 1 MICROGRAM(S)/KG/HR: 50 INJECTION INTRAVENOUS at 03:46

## 2017-11-02 RX ADMIN — Medication 1: at 18:47

## 2017-11-02 RX ADMIN — Medication 1: at 05:10

## 2017-11-02 RX ADMIN — HEPARIN SODIUM 5000 UNIT(S): 5000 INJECTION INTRAVENOUS; SUBCUTANEOUS at 13:20

## 2017-11-02 RX ADMIN — HEPARIN SODIUM 5000 UNIT(S): 5000 INJECTION INTRAVENOUS; SUBCUTANEOUS at 05:11

## 2017-11-02 RX ADMIN — SODIUM CHLORIDE 125 MILLILITER(S): 9 INJECTION, SOLUTION INTRAVENOUS at 03:30

## 2017-11-02 RX ADMIN — PROPOFOL 22.08 MICROGRAM(S)/KG/MIN: 10 INJECTION, EMULSION INTRAVENOUS at 03:31

## 2017-11-02 RX ADMIN — Medication 50 GRAM(S): at 05:10

## 2017-11-02 RX ADMIN — SODIUM CHLORIDE 125 MILLILITER(S): 9 INJECTION, SOLUTION INTRAVENOUS at 13:20

## 2017-11-02 RX ADMIN — FENTANYL CITRATE 7.36 MICROGRAM(S)/KG/HR: 50 INJECTION INTRAVENOUS at 03:31

## 2017-11-02 RX ADMIN — FENTANYL CITRATE 7.36 MICROGRAM(S)/KG/HR: 50 INJECTION INTRAVENOUS at 13:21

## 2017-11-02 NOTE — BRIEF OPERATIVE NOTE - PRE-OP DX
Obstructive pyelonephritis  11/02/2017  Right  Active  Jamaal Freitas  Ureteral calculus, right  11/02/2017    Active  Jamaal Freitas

## 2017-11-02 NOTE — CONSULT NOTE ADULT - SUBJECTIVE AND OBJECTIVE BOX
SICU Consultation Note  =====================================================  HPI: 62yo M with PMHx of HTN, Type II DM, and kidney stones who presents c/o generalized malaise and weakness x2 days.  He states he had some mild right flank pain that lasted a couple of minutes yesterday, however, pain similar to that of passing a kidney stone and presently denies abdominal pain.  Shortly after, developed a fever along with chills and rigors. Of note, also c/o foul smelly urine for the past couple of days. Denies urethral discharge, hematuria, nausea and vomiting, URI symptoms.    In the ED pt was given 5L fluid bolus and was on a Levo infusion.    On CT scan, "Mild right-sided hydronephrosis secondary to an obstructing 4 mm calculus of the right UVJ."  Pt went to the OR and had a Cystoscopy, right ureteral stent placement, and retrograde pyelogram  As per anesthesia, very anterior and difficult airway.  Airway placed via Glidescope guided Fiberoptic intubation.  Right femoral arterial line placed in the OR upon extubation for hypotension.    Surgery Information   Case Duration: 	EBL: 	IV Fluids: 3L Crystalloid	Blood Products: 	Urine Output:   Complications: none    Allergies:   PAST MEDICAL & SURGICAL HISTORY:  Renal stone  DM (diabetes mellitus)  HTN (hypertension)  History of cholecystectomy    FAMILY HISTORY:      SOCIAL HISTORY:      ADVANCE DIRECTIVES: Presumed Full Code  ***    REVIEW OF SYSTEMS:  ***  General: Non-Contributory  Skin/Breast: Non-Contributory  Ophthalmologic: Non-Contributory  ENMT: Non-Contributory  Respiratory and Thorax: Non-Contributory  Cardiovascular: Non-Contributory  Gastrointestinal: Non-Contributory  Genitourinary: Non-Contributory  Musculoskeletal: Non-Contributory  Neurological: Non-Contributory  Psychiatric: Non-Contributory  Hematology/Lymphatics: Non-Contributory  Endocrine: Non-Contributory  Allergic/Immunologic: none    HOME MEDICATIONS:  ***    CURRENT MEDICATIONS:   --------------------------------------------------------------------------------------  Neurologic Medications  fentaNYL   Infusion 1 MICROgram(s)/kG/Hr IV Continuous <Continuous>  propofol Infusion 50 MICROgram(s)/kG/Min IV Continuous <Continuous>    Respiratory Medications    Cardiovascular Medications    Gastrointestinal Medications  lactated ringers. 1000 milliLiter(s) IV Continuous <Continuous>    Genitourinary Medications    Hematologic/Oncologic Medications  heparin  Injectable 5000 Unit(s) SubCutaneous every 8 hours    Antimicrobial/Immunologic Medications    Endocrine/Metabolic Medications  insulin lispro (HumaLOG) corrective regimen sliding scale   SubCutaneous every 6 hours    Topical/Other Medications    --------------------------------------------------------------------------------------    VITAL SIGNS, INS/OUTS (last 24 hours):  --------------------------------------------------------------------------------------  ((Insert SICU Vitals / Is+Os here)) ***  --------------------------------------------------------------------------------------    EXAM  NEUROLOGY  RASS:   	GCS:    Exam: intubated and sedated    HEENT  Exam: Normocephalic, atraumatic    RESPIRATORY  Exam: Lungs clear to auscultation, Normal expansion/effort.  ***  Mechanical Ventilation: /12/5/50    CARDIOVASCULAR  Exam: S1, S2.  Regular rate and rhythm.    GI/NUTRITION  Exam: Abdomen soft, Non-tender, Non-distended.    Current Diet:  NPO    VASCULAR  Exam: Extremities warm, pink, well-perfused.    MUSCULOSKELETAL  Exam: All extremities moving spontaneously without limitations.      SKIN:  Exam: Good skin turgor, no skin breakdown.      METABOLIC/FLUIDS/ELECTROLYTES  lactated ringers. 1000 milliLiter(s) IV Continuous <Continuous>      HEMATOLOGIC  [x] DVT Prophylaxis: heparin  Injectable 5000 Unit(s) SubCutaneous every 8 hours    Transfusions:	[] PRBC	[] Platelets		[] FFP	[] Cryoprecipitate    INFECTIOUS DISEASE  Antimicrobials/Immunologic Medications:    Day #  	of    ***    Tubes/Lines/Drains  ***  [x] Peripheral IV  [] Central Venous Line     	[] R	[] L	[] IJ	[] Fem	[] SC	Date Placed:   [x] Arterial Line		[x] R	[] L	[x] Fem	[] Rad	[] Ax	Date Placed: 17  [] PICC:         	[] Midline		[] Mediport  [] Urinary Catheter		Date Placed:     LABS  --------------------------------------------------------------------------------------  CBC ( @ 17:00)                          13.4                     8.02    )--------------(  132<L>     93.5<H>% Neuts, 3.4<L>% Lymphs, ANC: 7.49<H>                          39.2      BMP ( @ 17:00)       140     |  103     |  40<H> 			Ca++ --      Ca 8.5          ---------------------------------( 126<H>		Mg --           3.0<L>  |  20<L>   |  1.77<H>			Ph --        LFTs ( @ 17:00)      TPro 6.6 / Alb 3.3 / TBili 0.4 / DBili -- / AST 19 / ALT 23 / AlkPhos 96    Coags ( @ 21:19)  aPTT 26.1<L> / INR 1.20<H> / PT 13.5<H>      ABG ( @ 03:15)     7.29<L> / 32<L> / 110<H> / 16<L> / -10.6 / 97.5%     Lactate: 2.1<H>     VBG ( @ 19:05)     7.35 / 39<L> / 27<L> / 21 / -3.4 / 44.2<L>%      Lactate: 2.4<H>  VBG ( @ 17:50)     7.46<H> / 30<L> / 47<H> / 23 / -2.4 / 83.6%      Lactate: 2.8<H>    Urinalysis ( @ 17:50):     Color: YELLOW / Appearance: HAZY / S.015 / pH: 6.0 / Gluc: 100 / Ketones: NEGATIVE / Bili: NEGATIVE / Urobili: NORMAL / Protein :100 / Nitrites: NEGATIVE / Leuk.Est: LARGE<H> / RBC: 2-5 / WBC: >50<H> / Sq Epi:  / Non Sq Epi:  / Bacteria FEW         --------------------------------------------------------------------------------------    OTHER LABS    IMAGING RESULTS  Echo:   CT:   Xray:     ASSESSMENT:  62yo M with PMHx of HTN, Type II DM, and kidney stones who presents with urosepsis 2/2 a 4 mm calculus of the right UVJ.  Pt s/p cysto, R ureteral stent placement & retrograde pyelogram.  Pt very difficult anterior airway as per anesthesia.    PLAN:    Neurologic:   sedate with propofol and fentanyl gtt    Respiratory:   currently intubated on AC  wean as tolerated    Cardiovascular:   off pressor support, cont to monitor    Gastrointestinal/Nutrition: NPO    Renal/Genitourinary: Almendarez in place, follow UOP  Renal rec's recommended    Hematologic: SQH    Infectious Disease: c/w Ceftriaxone    Tubes/Lines/Drains: PIV, R femoral A-line    Endocrine: HISS, FS q6H    Disposition: SICU    --------------------------------------------------------------------------------------    Critical Care Diagnoses: urosepsis SICU Consultation Note  =====================================================  HPI: 60yo M with PMHx of HTN, Type II DM, and kidney stones who presents c/o generalized malaise and weakness x2 days.  He states he had some mild right flank pain that lasted a couple of minutes yesterday, however, pain similar to that of passing a kidney stone and presently denies abdominal pain.  Shortly after, developed a fever along with chills and rigors. Of note, also c/o foul smelly urine for the past couple of days. Denies urethral discharge, hematuria, nausea and vomiting, URI symptoms.    In the ED pt was given 5L fluid bolus and was on a Levo infusion.    On CT scan, "Mild right-sided hydronephrosis secondary to an obstructing 4 mm calculus of the right UVJ."  Pt went to the OR and had a Cystoscopy, right ureteral stent placement, and retrograde pyelogram  As per anesthesia, very anterior and difficult airway.  Airway placed via Glidescope guided Fiberoptic intubation.  Right femoral arterial line placed in the OR upon extubation for hypotension.    Surgery Information   Case Duration: 	EBL: 	IV Fluids: 3L Crystalloid	Blood Products: 	Urine Output:   Complications: none    Allergies:   PAST MEDICAL & SURGICAL HISTORY:  Renal stone  DM (diabetes mellitus)  HTN (hypertension)  History of cholecystectomy        ADVANCE DIRECTIVES: Presumed Full Code      REVIEW OF SYSTEMS:   General: Non-Contributory  Skin/Breast: Non-Contributory  Ophthalmologic: Non-Contributory  ENMT: Non-Contributory  Respiratory and Thorax: Non-Contributory  Cardiovascular: Non-Contributory  Gastrointestinal: Non-Contributory  Genitourinary: Non-Contributory  Musculoskeletal: Non-Contributory  Neurological: Non-Contributory  Psychiatric: Non-Contributory  Hematology/Lymphatics: Non-Contributory  Endocrine: Non-Contributory  Allergic/Immunologic: none    HOME MEDICATIONS:  ***    CURRENT MEDICATIONS:   --------------------------------------------------------------------------------------  Neurologic Medications  fentaNYL   Infusion 1 MICROgram(s)/kG/Hr IV Continuous <Continuous>  propofol Infusion 50 MICROgram(s)/kG/Min IV Continuous <Continuous>    Respiratory Medications    Cardiovascular Medications    Gastrointestinal Medications  lactated ringers. 1000 milliLiter(s) IV Continuous <Continuous>    Genitourinary Medications    Hematologic/Oncologic Medications  heparin  Injectable 5000 Unit(s) SubCutaneous every 8 hours    Antimicrobial/Immunologic Medications    Endocrine/Metabolic Medications  insulin lispro (HumaLOG) corrective regimen sliding scale   SubCutaneous every 6 hours    Topical/Other Medications    --------------------------------------------------------------------------------------    VITAL SIGNS, INS/OUTS (last 24 hours):  --------------------------------------------------------------------------------------  T(C): 35.8 (17 @ 12:00), Max: 39.4 (17 @ 17:00)  HR: 59 (17 @ 12:30) (55 - 111)  BP: 146/77 (17 @ 02:30) (76/56 - 146/77)  RR: 12 (17 @ 12:30) (12 - 22)  SpO2: 98% (17 @ 12:30) (94% - 100%)  Wt(kg): --   @ 07:01  -   @ 07:00  --------------------------------------------------------  IN:    fentaNYL  Infusion: 37 mL    lactated ringers.: 750 mL    propofol Infusion: 88.5 mL    Sodium Chloride 0.9% IV Bolus: 4000 mL  Total IN: 4875.5 mL    OUT:    Indwelling Catheter - Urethral: 800 mL    Voided: 265 mL  Total OUT: 1065 mL    Total NET: 3810.5 mL       @ 07:01  -   @ 12:42  --------------------------------------------------------  IN:    fentaNYL  Infusion: 37 mL    lactated ringers.: 525 mL    propofol Infusion: 75.3 mL  Total IN: 637.3 mL    OUT:    Indwelling Catheter - Urethral: 450 mL  Total OUT: 450 mL    Total NET: 187.3 mL        --------------------------------------------------------------------------------------    EXAM  NEUROLOGY  RASS:  -2   Exam: intubated and sedated    HEENT  Exam: Normocephalic, atraumatic    RESPIRATORY  Exam: Lungs clear to auscultation,  Mechanical Ventilation: /12/5/50    CARDIOVASCULAR  Exam: S1, S2.  Regular rate and rhythm.    GI/NUTRITION  Exam: Abdomen soft, Non-tender, Non-distended.    Current Diet:  NPO    VASCULAR  Exam: Extremities warm, pink, well-perfused.    MUSCULOSKELETAL  Exam: All extremities moving spontaneously without limitations.      SKIN:  Exam: Good skin turgor, no skin breakdown.      METABOLIC/FLUIDS/ELECTROLYTES  lactated ringers. 1000 milliLiter(s) IV Continuous <Continuous>      HEMATOLOGIC  [x] DVT Prophylaxis: heparin  Injectable 5000 Unit(s) SubCutaneous every 8 hours    Transfusions:	[] PRBC	[] Platelets		[] FFP	[] Cryoprecipitate- N/A    INFECTIOUS DISEASE  Antimicrobials/Immunologic Medications:      Tubes/Lines/Drains  ***  [x] Peripheral IV  [] Central Venous Line     	[] R	[] L	[] IJ	[] Fem	[] SC	Date Placed:   [x] Arterial Line		[x] R	[] L	[x] Fem	[] Rad	[] Ax	Date Placed: 17  [] PICC:         	[] Midline		[] Mediport  [] Urinary Catheter		Date Placed:     LABS  --------------------------------------------------------------------------------------  CBC ( @ 17:00)                          13.4                     8.02    )--------------(  132<L>     93.5<H>% Neuts, 3.4<L>% Lymphs, ANC: 7.49<H>                          39.2      BMP ( @ 17:00)       140     |  103     |  40<H> 			Ca++ --      Ca 8.5          ---------------------------------( 126<H>		Mg --           3.0<L>  |  20<L>   |  1.77<H>			Ph --        LFTs ( @ 17:00)      TPro 6.6 / Alb 3.3 / TBili 0.4 / DBili -- / AST 19 / ALT 23 / AlkPhos 96    Coags ( @ 21:19)  aPTT 26.1<L> / INR 1.20<H> / PT 13.5<H>      ABG ( @ 03:15)     7.29<L> / 32<L> / 110<H> / 16<L> / -10.6 / 97.5%     Lactate: 2.1<H>     VBG ( @ 19:05)     7.35 / 39<L> / 27<L> / 21 / -3.4 / 44.2<L>%      Lactate: 2.4<H>  VBG ( @ 17:50)     7.46<H> / 30<L> / 47<H> / 23 / -2.4 / 83.6%      Lactate: 2.8<H>    Urinalysis ( @ 17:50):     Color: YELLOW / Appearance: HAZY / S.015 / pH: 6.0 / Gluc: 100 / Ketones: NEGATIVE / Bili: NEGATIVE / Urobili: NORMAL / Protein :100 / Nitrites: NEGATIVE / Leuk.Est: LARGE<H> / RBC: 2-5 / WBC: >50<H> / Sq Epi:  / Non Sq Epi:  / Bacteria FEW         --------------------------------------------------------------------------------------    OTHER LABS    IMAGING RESULTS  Echo:   CT:   Xray:     ASSESSMENT:  60yo M with PMHx of HTN, Type II DM, and kidney stones who presents with urosepsis 2/2 a 4 mm calculus of the right UVJ.  Pt s/p cysto, R ureteral stent placement & retrograde pyelogram.  Pt very difficult anterior airway as per anesthesia.    PLAN:    Neurologic: wean sedation  sedate with propofol and fentanyl gtt    Respiratory:   currently intubated on AC  wean as tolerated  have anesthesia notified for extubation given difficult airway    Cardiovascular:   cont to monitor  currently no pressors  Gastrointestinal/Nutrition: NPO    Renal/Genitourinary: Almendarez in place, follow UOP  Renal rec's recommended    Hematologic: SQH    Infectious Disease: c/w Ceftriaxone, f/u urology recommendations    Tubes/Lines/Drains: PIV, R femoral A-line    Endocrine: HISS, FS q6H    Disposition: SICU    --------------------------------------------------------------------------------------    Critical Care Diagnoses: urosepsis, septic shock, respiratory failure

## 2017-11-02 NOTE — PROGRESS NOTE ADULT - ASSESSMENT
61 year old male PMHx HTN, DM presents with sepsis 2/2 to obstructing ureteral calculus, found to have e.coli on Bcx.

## 2017-11-02 NOTE — BRIEF OPERATIVE NOTE - PROCEDURE
<<-----Click on this checkbox to enter Procedure Cystoscopy  11/02/2017  right ureteral stent placement, retrograde pyelogram  Active  [id number]

## 2017-11-02 NOTE — PROGRESS NOTE ADULT - SUBJECTIVE AND OBJECTIVE BOX
Continues to be intubated.  Pressures stable, on no pressors.  Continued fentanyl and propfol but awake and alert.  Almendarez clear yellow.    Objective    Vital signs  T(F): , Max: 103 (11-01-17 @ 17:00)  HR: 62 (11-02-17 @ 15:16)  BP: 146/77 (11-02-17 @ 02:30)  SpO2: 99% (11-02-17 @ 15:16)  Wt(kg): --    Output     11-01 @ 07:01  -  11-02 @ 07:00  --------------------------------------------------------  IN: 4875.5 mL / OUT: 1065 mL / NET: 3810.5 mL    11-02 @ 07:01  -  11-02 @ 15:55  --------------------------------------------------------  IN: 924.1 mL / OUT: 550 mL / NET: 374.1 mL    Gen NAD, intubated, awake  Abd soft, nt, nd   No CVAT.  Almendarez in place, draining yellow urine    Labs    11-02 @ 11:15    WBC --    / Hct --    / SCr 1.61     11-02 @ 03:15    WBC 18.74 / Hct 35.3  / SCr 1.60     Culture - Blood (11.01.17 @ 18:02)    Culture - Blood:   ***Blood Panel PCR results on this specimen are available  approximately 3 hours after the Gram stain result***  Gram stain, PCR, and/or culture results may not always  correspond due to difference in methodologies  ------------------------------------------------------------  This PCR assay was performed using [employer].  The  following targets are tested for:  Enterococcus, vancomycin  resistant enterococci, Listeria monocytogenes,  coagulase  negative staphylococci, S. aureus, methicillin resistant S.  aureus, Streptococcus agalactiae (Group B), S. pneumoniae,  S. pyogenes (Group A), Acinetobacter baumannii, Enterobacter  cloacae, E. coli, Klebsiella oxytoca, K. pneumoniae, Proteus  sp., Serratia marcescens, Haemophilus influenzae, Neisseria  meningitidis, Pseudomonas aeruginosa, Candida albicans, C.  glabrata, C. krusei, C. parapsilosis, C. tropicalis and the  KPC resistance gene.    -  Escherichia coli: + DETECT IMAN    Specimen Source: BLOOD PERIPHERAL    Organism: BLOOD CULTURE PCR    Gram Stain Blood:   ***** CRITICAL RESULT *****  PERSON CALLED / READ-BACK: SANTINO MCKEON MD/Y  DATE / TIME CALLED: 11/02/17 1100  CALLED BY: ARIAN DAN^Gram Neg Rods  AFTER: 15 HOURS INCUBATION  BOTTLE: AEROBIC BOTTLE    Organism Identification: BLOOD CULTURE PCR    Method Type: PCR    Imaging    < from: CT Abdomen and Pelvis w/ Oral Cont (11.01.17 @ 20:27) >  MPRESSION:   Mild right-sided hydronephrosis secondary to an obstructing 4 mm calculus   of the right UVJ.    Circumferential wall thickening of the urinary bladder may be on the   basis of an enlarged prostate versus cystitis. Correlation with   urinalysis may be helpful.       < end of copied text >

## 2017-11-02 NOTE — BRIEF OPERATIVE NOTE - POST-OP DX
Obstructive pyelonephritis  11/02/2017  right  Active  aJmaal Freitas  Ureteral calculus, right  11/02/2017    Active  Jamaal Freitas

## 2017-11-02 NOTE — PROGRESS NOTE ADULT - PROBLEM SELECTOR PLAN 1
-Ceftriaxone  -f/u cultures  -Wean pressors, sedation  -Wean to extubate  -Trend outputs, continue fluids for sepsis

## 2017-11-03 DIAGNOSIS — E11.9 TYPE 2 DIABETES MELLITUS WITHOUT COMPLICATIONS: ICD-10-CM

## 2017-11-03 DIAGNOSIS — N39.0 URINARY TRACT INFECTION, SITE NOT SPECIFIED: ICD-10-CM

## 2017-11-03 DIAGNOSIS — I10 ESSENTIAL (PRIMARY) HYPERTENSION: ICD-10-CM

## 2017-11-03 DIAGNOSIS — Z29.9 ENCOUNTER FOR PROPHYLACTIC MEASURES, UNSPECIFIED: ICD-10-CM

## 2017-11-03 DIAGNOSIS — A41.9 SEPSIS, UNSPECIFIED ORGANISM: ICD-10-CM

## 2017-11-03 LAB
APTT BLD: 28.2 SEC — SIGNIFICANT CHANGE UP (ref 27.5–37.4)
BACTERIA BLD CULT: SIGNIFICANT CHANGE UP
BUN SERPL-MCNC: 31 MG/DL — HIGH (ref 7–23)
CALCIUM SERPL-MCNC: 8.5 MG/DL — SIGNIFICANT CHANGE UP (ref 8.4–10.5)
CHLORIDE SERPL-SCNC: 110 MMOL/L — HIGH (ref 98–107)
CO2 SERPL-SCNC: 20 MMOL/L — LOW (ref 22–31)
CREAT SERPL-MCNC: 1.19 MG/DL — SIGNIFICANT CHANGE UP (ref 0.5–1.3)
E COLI DNA BLD POS QL NAA+NON-PROBE: SIGNIFICANT CHANGE UP
GLUCOSE BLDC GLUCOMTR-MCNC: 119 MG/DL — HIGH (ref 70–99)
GLUCOSE BLDC GLUCOMTR-MCNC: 131 MG/DL — HIGH (ref 70–99)
GLUCOSE BLDC GLUCOMTR-MCNC: 133 MG/DL — HIGH (ref 70–99)
GLUCOSE BLDC GLUCOMTR-MCNC: 219 MG/DL — HIGH (ref 70–99)
GLUCOSE BLDC GLUCOMTR-MCNC: 260 MG/DL — HIGH (ref 70–99)
GLUCOSE SERPL-MCNC: 137 MG/DL — HIGH (ref 70–99)
HBA1C BLD-MCNC: 8.7 % — HIGH (ref 4–5.6)
HCT VFR BLD CALC: 33.2 % — LOW (ref 39–50)
HGB BLD-MCNC: 11.4 G/DL — LOW (ref 13–17)
INR BLD: 1.22 — HIGH (ref 0.88–1.17)
MAGNESIUM SERPL-MCNC: 1.7 MG/DL — SIGNIFICANT CHANGE UP (ref 1.6–2.6)
MCHC RBC-ENTMCNC: 31 PG — SIGNIFICANT CHANGE UP (ref 27–34)
MCHC RBC-ENTMCNC: 34.3 % — SIGNIFICANT CHANGE UP (ref 32–36)
MCV RBC AUTO: 90.2 FL — SIGNIFICANT CHANGE UP (ref 80–100)
NRBC # FLD: 0 — SIGNIFICANT CHANGE UP
ORGANISM # SPEC MICROSCOPIC CNT: SIGNIFICANT CHANGE UP
ORGANISM # SPEC MICROSCOPIC CNT: SIGNIFICANT CHANGE UP
PHOSPHATE SERPL-MCNC: 2.2 MG/DL — LOW (ref 2.5–4.5)
PLATELET # BLD AUTO: 134 K/UL — LOW (ref 150–400)
PMV BLD: 11 FL — SIGNIFICANT CHANGE UP (ref 7–13)
POTASSIUM SERPL-MCNC: 4.1 MMOL/L — SIGNIFICANT CHANGE UP (ref 3.5–5.3)
POTASSIUM SERPL-SCNC: 4.1 MMOL/L — SIGNIFICANT CHANGE UP (ref 3.5–5.3)
PROTHROM AB SERPL-ACNC: 13.7 SEC — HIGH (ref 9.8–13.1)
RBC # BLD: 3.68 M/UL — LOW (ref 4.2–5.8)
RBC # FLD: 13.2 % — SIGNIFICANT CHANGE UP (ref 10.3–14.5)
SODIUM SERPL-SCNC: 143 MMOL/L — SIGNIFICANT CHANGE UP (ref 135–145)
SPECIMEN SOURCE: SIGNIFICANT CHANGE UP
WBC # BLD: 17.02 K/UL — HIGH (ref 3.8–10.5)
WBC # FLD AUTO: 17.02 K/UL — HIGH (ref 3.8–10.5)

## 2017-11-03 PROCEDURE — 99223 1ST HOSP IP/OBS HIGH 75: CPT | Mod: AI

## 2017-11-03 RX ORDER — OXYCODONE HYDROCHLORIDE 5 MG/1
5 TABLET ORAL EVERY 4 HOURS
Qty: 0 | Refills: 0 | Status: DISCONTINUED | OUTPATIENT
Start: 2017-11-03 | End: 2017-11-04

## 2017-11-03 RX ORDER — DEXTROSE 50 % IN WATER 50 %
25 SYRINGE (ML) INTRAVENOUS ONCE
Qty: 0 | Refills: 0 | Status: DISCONTINUED | OUTPATIENT
Start: 2017-11-03 | End: 2017-11-04

## 2017-11-03 RX ORDER — INSULIN LISPRO 100/ML
VIAL (ML) SUBCUTANEOUS AT BEDTIME
Qty: 0 | Refills: 0 | Status: DISCONTINUED | OUTPATIENT
Start: 2017-11-03 | End: 2017-11-04

## 2017-11-03 RX ORDER — GLUCAGON INJECTION, SOLUTION 0.5 MG/.1ML
1 INJECTION, SOLUTION SUBCUTANEOUS ONCE
Qty: 0 | Refills: 0 | Status: DISCONTINUED | OUTPATIENT
Start: 2017-11-03 | End: 2017-11-04

## 2017-11-03 RX ORDER — DOCUSATE SODIUM 100 MG
100 CAPSULE ORAL THREE TIMES A DAY
Qty: 0 | Refills: 0 | Status: DISCONTINUED | OUTPATIENT
Start: 2017-11-03 | End: 2017-11-04

## 2017-11-03 RX ORDER — SENNA PLUS 8.6 MG/1
2 TABLET ORAL AT BEDTIME
Qty: 0 | Refills: 0 | Status: DISCONTINUED | OUTPATIENT
Start: 2017-11-03 | End: 2017-11-04

## 2017-11-03 RX ORDER — DEXTROSE 50 % IN WATER 50 %
12.5 SYRINGE (ML) INTRAVENOUS ONCE
Qty: 0 | Refills: 0 | Status: DISCONTINUED | OUTPATIENT
Start: 2017-11-03 | End: 2017-11-04

## 2017-11-03 RX ORDER — TAMSULOSIN HYDROCHLORIDE 0.4 MG/1
0.4 CAPSULE ORAL AT BEDTIME
Qty: 0 | Refills: 0 | Status: DISCONTINUED | OUTPATIENT
Start: 2017-11-03 | End: 2017-11-04

## 2017-11-03 RX ORDER — ACETAMINOPHEN 500 MG
650 TABLET ORAL EVERY 6 HOURS
Qty: 0 | Refills: 0 | Status: DISCONTINUED | OUTPATIENT
Start: 2017-11-03 | End: 2017-11-04

## 2017-11-03 RX ORDER — DEXTROSE 50 % IN WATER 50 %
1 SYRINGE (ML) INTRAVENOUS ONCE
Qty: 0 | Refills: 0 | Status: DISCONTINUED | OUTPATIENT
Start: 2017-11-03 | End: 2017-11-04

## 2017-11-03 RX ORDER — MAGNESIUM SULFATE 500 MG/ML
2 VIAL (ML) INJECTION ONCE
Qty: 0 | Refills: 0 | Status: COMPLETED | OUTPATIENT
Start: 2017-11-03 | End: 2017-11-03

## 2017-11-03 RX ORDER — SODIUM CHLORIDE 9 MG/ML
1000 INJECTION, SOLUTION INTRAVENOUS
Qty: 0 | Refills: 0 | Status: DISCONTINUED | OUTPATIENT
Start: 2017-11-03 | End: 2017-11-04

## 2017-11-03 RX ORDER — SODIUM CHLORIDE 9 MG/ML
1000 INJECTION, SOLUTION INTRAVENOUS
Qty: 0 | Refills: 0 | Status: DISCONTINUED | OUTPATIENT
Start: 2017-11-03 | End: 2017-11-03

## 2017-11-03 RX ORDER — INSULIN LISPRO 100/ML
VIAL (ML) SUBCUTANEOUS
Qty: 0 | Refills: 0 | Status: DISCONTINUED | OUTPATIENT
Start: 2017-11-03 | End: 2017-11-04

## 2017-11-03 RX ADMIN — CEFTRIAXONE 100 GRAM(S): 500 INJECTION, POWDER, FOR SOLUTION INTRAMUSCULAR; INTRAVENOUS at 05:48

## 2017-11-03 RX ADMIN — Medication 1: at 22:41

## 2017-11-03 RX ADMIN — HEPARIN SODIUM 5000 UNIT(S): 5000 INJECTION INTRAVENOUS; SUBCUTANEOUS at 21:43

## 2017-11-03 RX ADMIN — HEPARIN SODIUM 5000 UNIT(S): 5000 INJECTION INTRAVENOUS; SUBCUTANEOUS at 05:49

## 2017-11-03 RX ADMIN — Medication 2: at 12:46

## 2017-11-03 RX ADMIN — HEPARIN SODIUM 5000 UNIT(S): 5000 INJECTION INTRAVENOUS; SUBCUTANEOUS at 14:11

## 2017-11-03 RX ADMIN — Medication 50 GRAM(S): at 05:49

## 2017-11-03 RX ADMIN — TAMSULOSIN HYDROCHLORIDE 0.4 MILLIGRAM(S): 0.4 CAPSULE ORAL at 21:43

## 2017-11-03 NOTE — PROGRESS NOTE ADULT - SUBJECTIVE AND OBJECTIVE BOX
Progress Note    Subjective: much improved, extubated last night, asking about plan to go home    Objective    Vital signs  T(F): , Max: 100 (11-03-17 @ 21:39)  HR: 83  BP: 144/75  SpO2: 96%    Output   OUT:    Indwelling Catheter - Urethral: 3340 mL clear yellow  Total OUT: 3340 mL      Gen NAD, awake, alert  Abd soft, ND, R flank tender, no CVAT   benjamin in place, draining well, secured    Diet/Fluids  CC regular diet    Labs:                        11.4   17.02 )-----------( 134      ( 03 Nov 2017 03:00 )             33.2   11-03    143  |  110<H>  |  31<H>  ----------------------------<  137<H>  4.1   |  20<L>  |  1.19    Ca    8.5      03 Nov 2017 03:00  Phos  2.2     11-03  Mg     1.7     11-03        Antibiotics: ceftriaxone      Imaging

## 2017-11-03 NOTE — PROGRESS NOTE ADULT - SUBJECTIVE AND OBJECTIVE BOX
Patient seen and examined.  Doing well.  Continues on abx.  Urine and blood culture with E coli.  Voiding well.  No flank pain    Vital Signs Last 24 Hrs  T(C): 36.9 (03 Nov 2017 17:18), Max: 37.6 (03 Nov 2017 08:23)  T(F): 98.4 (03 Nov 2017 17:18), Max: 99.7 (03 Nov 2017 08:23)  HR: 82 (03 Nov 2017 17:18) (71 - 85)  BP: 133/71 (03 Nov 2017 17:18) (108/60 - 137/73)  BP(mean): --  RR: 17 (03 Nov 2017 17:18) (11 - 21)  SpO2: 97% (03 Nov 2017 17:18) (95% - 100%)    Abdomen soft, nt, nd  no cvat  no sp tenderness

## 2017-11-03 NOTE — CONSULT NOTE ADULT - PROBLEM SELECTOR RECOMMENDATION 4
On metformin 1000 mg PO BID at home. Would continue inpatient with sliding scale and diabetic diet. FS qid

## 2017-11-03 NOTE — CONSULT NOTE ADULT - PROBLEM SELECTOR RECOMMENDATION 9
Now RESOLVED s/p right ureteral stent placement. Patient has 0/4 SIRS criteria and feels well. Continue with Ceftriaxone for antibiotics. Euvolemic on exam and does not need IVF at this time

## 2017-11-03 NOTE — PROGRESS NOTE ADULT - SUBJECTIVE AND OBJECTIVE BOX
SICU Daily Progress Note  =====================================================  Interval/Overnight Events:  Extubated yesterday, off pressors, clinically improved.     SICU Day # 2    HPI: 62yo M with PMHx of HTN, Type II DM, and kidney stones who presents c/o generalized malaise and weakness x2 days.  He states he had some mild right flank pain that lasted a couple of minutes yesterday, however, pain similar to that of passing a kidney stone and presently denies abdominal pain.  Shortly after, developed a fever along with chills and rigors. Of note, also c/o foul smelly urine for the past couple of days. Denies urethral discharge, hematuria, nausea and vomiting, URI symptoms. In the ED pt was given 5L fluid bolus and was on a Levo infusion. On CT scan, "Mild right-sided hydronephrosis secondary to an obstructing 4 mm calculus of the right UVJ." Pt went to the OR and had a Cystoscopy, right ureteral stent placement, and retrograde pyelogram. As per anesthesia, very anterior and difficult airway.  Airway placed via Glidescope guided Fiberoptic intubation.  Right femoral arterial line placed in the OR upon extubation for hypotension. Patient admitted to SICU for septic shock.       PMH:    Renal stone  DM (diabetes mellitus)  HTN (hypertension)  History of cholecystectomy    Allergies: No Known Allergies      MEDICATIONS:   --------------------------------------------------------------------------------------  Neurologic Medications    Respiratory Medications    Cardiovascular Medications    Gastrointestinal Medications  lactated ringers. 1000 milliLiter(s) IV Continuous <Continuous>    Genitourinary Medications    Hematologic/Oncologic Medications  heparin  Injectable 5000 Unit(s) SubCutaneous every 8 hours    Antimicrobial/Immunologic Medications  cefTRIAXone   IVPB        Endocrine/Metabolic Medications  insulin lispro (HumaLOG) corrective regimen sliding scale   SubCutaneous every 6 hours    Topical/Other Medications    --------------------------------------------------------------------------------------    VITAL SIGNS, INS/OUTS (last 24 hours):  --------------------------------------------------------------------------------------  ICU Vital Signs Last 24 Hrs  T(C): 36.1 (03 Nov 2017 00:00), Max: 36.4 (02 Nov 2017 14:00)  T(F): 97 (03 Nov 2017 00:00), Max: 97.5 (02 Nov 2017 14:00)  HR: 78 (03 Nov 2017 01:00) (55 - 84)  BP: 146/77 (02 Nov 2017 02:30) (146/77 - 146/77)  BP(mean): 95 (02 Nov 2017 02:30) (95 - 95)  ABP: 140/74 (03 Nov 2017 01:00) (92/58 - 163/83)  ABP(mean): 97 (03 Nov 2017 01:00) (70 - 112)  RR: 19 (03 Nov 2017 01:00) (10 - 21)  SpO2: 96% (03 Nov 2017 01:00) (94% - 100%)      --------------------------------------------------------------------------------------      EXAM    NEUROLOGY  Exam: Normal, NAD, alert, oriented x3, no focal deficits.     HEENT  Exam: Normocephalic, atraumatic, EOMI.  PERRL.    RESPIRATORY  Exam: Lungs clear to auscultation, Normal expansion/effort.     CARDIOVASCULAR  Exam: S1, S2.  Regular rate and rhythm.      GI/NUTRITION  Exam: Abdomen soft, Non-distended.  Minimally tender   Current Diet:  NPO x meds    VASCULAR  Exam: Extremities warm, pink, well-perfused.     MUSCULOSKELETAL  Exam: All extremities moving spontaneously without limitations.     SKIN  Exam: Good skin turgor, no skin breakdown.     METABOLIC/FLUIDS/ELECTROLYTES  lactated ringers. 1000 milliLiter(s) IV Continuous <Continuous>      HEMATOLOGIC  [x] VTE Prophylaxis: heparin  Injectable 5000 Unit(s) SubCutaneous every 8 hours    Transfusions:	[] PRBC	[] Platelets		[] FFP	[] Cryoprecipitate    INFECTIOUS DISEASE  Antimicrobials/Immunologic Medications:  cefTRIAXone   IVPB        Day # 2     of     ***    Tubes/Lines/Drains   [x] Peripheral IV  [] Central Venous Line     	[] R	[] L	[] IJ	[] Fem	[] SC	Date Placed:   [] Arterial Line		[] R	[] L	[] Fem	[] Rad	[] Ax	Date Placed:   [] PICC		[] Midline		[] Mediport  [] Urinary Catheter		Date Placed:   [x] Necessity of urinary, arterial, and venous catheters discussed    LABS  --------------------------------------------------------------------------------------  ***    --------------------------------------------------------------------------------------    OTHER LABORATORY:     IMAGING STUDIES:     CXR:

## 2017-11-03 NOTE — CONSULT NOTE ADULT - ASSESSMENT
61M hx of HTN, T2DM, Nephrolithiasis was admitted to [facility] for septic shock 2/2 infected kidney stone. Patient is s/p, right ureteral stent placement. Patient's clinical condition has now improved and he is no longer on pressors. Septic Shock resolved, and patient has been stepped down to surgical floor for continued post-operative care.

## 2017-11-03 NOTE — PROGRESS NOTE ADULT - ASSESSMENT
ASSESSMENT:  61y Male  s/p Ureteral stent for UVJ stone, admitted to SICU with Urosepsis.     PLAN:   Neurologic:   - Pain control     Respiratory:   - OOB to chair  - Incentive spirometery  - oxygen supplementation as needed    Cardiovascular:   - currently off pressors  - Monitor BP     Gastrointestinal/Nutrition:   - possibly advance diet today     Renal/Genitourinary:   - Almendarez in place, follow UOP    Hematologic:   - SQH for DVT ppx     Infectious Disease:   - continue Ceftriaxone    Tubes/Lines/Drains:   - PIV   - R femoral A-line    Endocrine:   -ISS with FS q6H    Disposition: SICU      --------------------------------------------------------------------------------------    Critical Care Diagnoses:

## 2017-11-03 NOTE — CONSULT NOTE ADULT - SUBJECTIVE AND OBJECTIVE BOX
HPI:  62yo M with PMHx of HTN, Type II DM, and kidney stones who presents c/o generalized malaise and weakness x2 days.  He states he had some mild right flank pain that lasted a couple of minutes yesterday, however, pain similar to that of passing a kidney stone and presently denies abdominal pain.  Shortly after, developed a fever along with chills and rigors. Of note, also c/o foul smelly urine for the past couple of days. Denies urethral discharge, hematuria, nausea and vomiting, URI symptoms.  On arrival to the ED, 1L of NS bolus given by EMT, shortly after 4L bolus in total for hypotension, SBP in 80’s, HR ’s with no response to bolus, started on Levophed. Given 2gm of Ceftriaxone and Tylenol. (2017 22:44)    Subjective:   Patient seen at bedside. He feels much better compared to admission. The only complaint he is putting forth right now is that he is trying to eat but he just cannot eat as much as he used to, he his however having BMs and drinking water. We discussed that it may take some time to get his full eating capacity back but is reassuring that he is moving his bowels.     PAST MEDICAL & SURGICAL HISTORY:  Renal stone  DM (diabetes mellitus)  HTN (hypertension)  History of cholecystectomy      Review of Systems:   CONSTITUTIONAL: No fever, weight loss, or fatigue  EYES: No eye pain, visual disturbances, or discharge  ENMT:  No difficulty hearing, tinnitus, vertigo; No sinus or throat pain  NECK: No pain or stiffness  RESPIRATORY: No cough, wheezing, chills or hemoptysis; No shortness of breath  CARDIOVASCULAR: No chest pain, palpitations, dizziness, or leg swelling  GASTROINTESTINAL: No abdominal or epigastric pain. No nausea, vomiting, or hematemesis; No diarrhea or constipation. No melena or hematochezia.  GENITOURINARY: No dysuria, frequency, hematuria, or incontinence  NEUROLOGICAL: No headaches, memory loss, loss of strength, numbness, or tremors  SKIN: No itching, burning, rashes, or lesions   LYMPH NODES: No enlarged glands  ENDOCRINE: No heat or cold intolerance; No hair loss  MUSCULOSKELETAL: No joint pain or swelling; No muscle, back, or extremity pain  HEME/LYMPH: No easy bruising, or bleeding gums  ALLERY AND IMMUNOLOGIC: No hives or eczema    Allergies    No Known Allergies    Intolerances        Social History: No alcohol, tobacco, or drug use    FAMILY HISTORY: reviewed with patient, non-contributory       MEDICATIONS  (STANDING):  cefTRIAXone   IVPB      dextrose 5% + sodium chloride 0.9%. 1000 milliLiter(s) (75 mL/Hr) IV Continuous <Continuous>  docusate sodium 100 milliGRAM(s) Oral three times a day  heparin  Injectable 5000 Unit(s) SubCutaneous every 8 hours  insulin lispro (HumaLOG) corrective regimen sliding scale   SubCutaneous every 6 hours  senna 2 Tablet(s) Oral at bedtime  tamsulosin 0.4 milliGRAM(s) Oral at bedtime    MEDICATIONS  (PRN):  acetaminophen   Tablet 650 milliGRAM(s) Oral every 6 hours PRN For Temp greater than 38 C (100.4 F)  acetaminophen   Tablet. 650 milliGRAM(s) Oral every 6 hours PRN Mild Pain (1 - 3)      Vital Signs Last 24 Hrs  T(C): 36.7 (2017 14:00), Max: 37.6 (2017 08:23)  T(F): 98 (2017 14:00), Max: 99.7 (2017 08:23)  HR: 77 (2017 14:00) (63 - 85)  BP: 108/60 (2017 14:00) (108/60 - 137/73)  BP(mean): --  RR: 17 (2017 14:00) (11 - 21)  SpO2: 100% (2017 14:00) (95% - 100%)  CAPILLARY BLOOD GLUCOSE  131 (2017 00:00)      POCT Blood Glucose.: 219 mg/dL (2017 12:21)  POCT Blood Glucose.: 119 mg/dL (2017 05:46)  POCT Blood Glucose.: 131 mg/dL (2017 23:58)  POCT Blood Glucose.: 168 mg/dL (2017 18:45)        PHYSICAL EXAM:  GENERAL: NAD, well-developed  HEAD:  Atraumatic, Normocephalic  EYES: EOMI, PERRLA, conjunctiva and sclera clear  NECK: Supple, No JVD  CHEST/LUNG: Clear to auscultation bilaterally; No wheeze  HEART: Regular rate and rhythm; No murmurs, rubs, or gallops  ABDOMEN: Soft, Nontender, Nondistended; Bowel sounds present  EXTREMITIES:  2+ Peripheral Pulses, No clubbing, cyanosis, or edema  PSYCH: AAOx3  NEUROLOGY: non-focal  SKIN: No rashes or lesions    LABS:                        11.4   17.02 )-----------( 134      ( 2017 03:00 )             33.2         143  |  110<H>  |  31<H>  ----------------------------<  137<H>  4.1   |  20<L>  |  1.19    Ca    8.5      2017 03:00  Phos  2.2       Mg     1.7         TPro  6.6  /  Alb  3.3  /  TBili  0.4  /  DBili  x   /  AST  19  /  ALT  23  /  AlkPhos  96      PT/INR - ( 2017 03:00 )   PT: 13.7 SEC;   INR: 1.22          PTT - ( 2017 03:00 )  PTT:28.2 SEC      Urinalysis Basic - ( 2017 17:50 )    Color: YELLOW / Appearance: HAZY / S.015 / pH: 6.0  Gluc: 100 / Ketone: NEGATIVE  / Bili: NEGATIVE / Urobili: NORMAL E.U.   Blood: MODERATE / Protein: 100 / Nitrite: NEGATIVE   Leuk Esterase: LARGE / RBC: 2-5 / WBC >50   Sq Epi: x / Non Sq Epi: x / Bacteria: FEW        EKG(personally reviewed):    RADIOLOGY & ADDITIONAL TESTS:    Imaging Personally Reviewed:    Consultant(s) Notes Reviewed:      Care Discussed with Consultants/Other Providers:

## 2017-11-04 ENCOUNTER — TRANSCRIPTION ENCOUNTER (OUTPATIENT)
Age: 62
End: 2017-11-04

## 2017-11-04 VITALS
HEART RATE: 77 BPM | OXYGEN SATURATION: 94 % | RESPIRATION RATE: 16 BRPM | TEMPERATURE: 98 F | SYSTOLIC BLOOD PRESSURE: 131 MMHG | DIASTOLIC BLOOD PRESSURE: 77 MMHG

## 2017-11-04 DIAGNOSIS — E11.9 TYPE 2 DIABETES MELLITUS WITHOUT COMPLICATIONS: ICD-10-CM

## 2017-11-04 LAB
-  AMIKACIN: SIGNIFICANT CHANGE UP
-  AMPICILLIN/SULBACTAM: SIGNIFICANT CHANGE UP
-  AMPICILLIN: SIGNIFICANT CHANGE UP
-  AZTREONAM: SIGNIFICANT CHANGE UP
-  CEFAZOLIN: SIGNIFICANT CHANGE UP
-  CEFEPIME: SIGNIFICANT CHANGE UP
-  CEFOXITIN: SIGNIFICANT CHANGE UP
-  CEFTAZIDIME: SIGNIFICANT CHANGE UP
-  CEFTRIAXONE: SIGNIFICANT CHANGE UP
-  CIPROFLOXACIN: SIGNIFICANT CHANGE UP
-  ERTAPENEM: SIGNIFICANT CHANGE UP
-  GENTAMICIN: SIGNIFICANT CHANGE UP
-  IMIPENEM: SIGNIFICANT CHANGE UP
-  LEVOFLOXACIN: SIGNIFICANT CHANGE UP
-  MEROPENEM: SIGNIFICANT CHANGE UP
-  NITROFURANTOIN: SIGNIFICANT CHANGE UP
-  PIPERACILLIN/TAZOBACTAM: SIGNIFICANT CHANGE UP
-  TIGECYCLINE: SIGNIFICANT CHANGE UP
-  TOBRAMYCIN: SIGNIFICANT CHANGE UP
-  TRIMETHOPRIM/SULFAMETHOXAZOLE: SIGNIFICANT CHANGE UP
BACTERIA UR CULT: SIGNIFICANT CHANGE UP
GLUCOSE BLDC GLUCOMTR-MCNC: 166 MG/DL — HIGH (ref 70–99)
GLUCOSE BLDC GLUCOMTR-MCNC: 198 MG/DL — HIGH (ref 70–99)
HCT VFR BLD CALC: 37.1 % — LOW (ref 39–50)
HGB BLD-MCNC: 12.7 G/DL — LOW (ref 13–17)
MCHC RBC-ENTMCNC: 30.4 PG — SIGNIFICANT CHANGE UP (ref 27–34)
MCHC RBC-ENTMCNC: 34.2 % — SIGNIFICANT CHANGE UP (ref 32–36)
MCV RBC AUTO: 88.8 FL — SIGNIFICANT CHANGE UP (ref 80–100)
METHOD TYPE: SIGNIFICANT CHANGE UP
METHOD TYPE: SIGNIFICANT CHANGE UP
NRBC # FLD: 0.02 — SIGNIFICANT CHANGE UP
ORGANISM # SPEC MICROSCOPIC CNT: SIGNIFICANT CHANGE UP
ORGANISM # SPEC MICROSCOPIC CNT: SIGNIFICANT CHANGE UP
PLATELET # BLD AUTO: 178 K/UL — SIGNIFICANT CHANGE UP (ref 150–400)
PMV BLD: 10.6 FL — SIGNIFICANT CHANGE UP (ref 7–13)
RBC # BLD: 4.18 M/UL — LOW (ref 4.2–5.8)
RBC # FLD: 12.7 % — SIGNIFICANT CHANGE UP (ref 10.3–14.5)
SPECIMEN SOURCE: SIGNIFICANT CHANGE UP
SPECIMEN SOURCE: SIGNIFICANT CHANGE UP
WBC # BLD: 11.89 K/UL — HIGH (ref 3.8–10.5)
WBC # FLD AUTO: 11.89 K/UL — HIGH (ref 3.8–10.5)

## 2017-11-04 PROCEDURE — 99233 SBSQ HOSP IP/OBS HIGH 50: CPT

## 2017-11-04 RX ORDER — OXYCODONE HYDROCHLORIDE 5 MG/1
1 TABLET ORAL
Qty: 20 | Refills: 0 | OUTPATIENT
Start: 2017-11-04

## 2017-11-04 RX ORDER — DOCUSATE SODIUM 100 MG
1 CAPSULE ORAL
Qty: 0 | Refills: 0 | DISCHARGE
Start: 2017-11-04

## 2017-11-04 RX ORDER — SENNA PLUS 8.6 MG/1
2 TABLET ORAL
Qty: 0 | Refills: 0 | DISCHARGE
Start: 2017-11-04

## 2017-11-04 RX ORDER — CEPHALEXIN 500 MG
1 CAPSULE ORAL
Qty: 44 | Refills: 0 | OUTPATIENT
Start: 2017-11-04 | End: 2017-11-15

## 2017-11-04 RX ORDER — TAMSULOSIN HYDROCHLORIDE 0.4 MG/1
1 CAPSULE ORAL
Qty: 30 | Refills: 0 | OUTPATIENT
Start: 2017-11-04 | End: 2017-12-04

## 2017-11-04 RX ADMIN — CEFTRIAXONE 100 GRAM(S): 500 INJECTION, POWDER, FOR SOLUTION INTRAMUSCULAR; INTRAVENOUS at 05:33

## 2017-11-04 RX ADMIN — Medication 2: at 08:35

## 2017-11-04 RX ADMIN — Medication 2: at 12:20

## 2017-11-04 RX ADMIN — HEPARIN SODIUM 5000 UNIT(S): 5000 INJECTION INTRAVENOUS; SUBCUTANEOUS at 05:33

## 2017-11-04 NOTE — DISCHARGE NOTE ADULT - MEDICATION SUMMARY - MEDICATIONS TO TAKE
I will START or STAY ON the medications listed below when I get home from the hospital:    oxyCODONE 5 mg oral tablet  -- 1 to 2 tab(s) by mouth every 4 to 6 hours, As needed, for pain MDD:6    -- Indication: For Pain    tamsulosin 0.4 mg oral capsule  -- 1 cap(s) by mouth once a day (at bedtime)  -- Indication: For Stent irritation    metFORMIN 1000 mg oral tablet  -- 1 tab(s) by mouth 2 times a day  -- Indication: For Home med    Keflex 500 mg oral capsule  -- 1 cap(s) by mouth 4 times a day     -- Finish all this medication unless otherwise directed by prescriber.    -- Indication: For Antibiotic    senna oral tablet  -- 2 tab(s) by mouth once a day (at bedtime)  -- Indication: For Constipation    docusate sodium 100 mg oral capsule  -- 1 cap(s) by mouth 3 times a day  -- Indication: For Constipation

## 2017-11-04 NOTE — PROGRESS NOTE ADULT - ASSESSMENT
This is a 61 year old male admitted with sepsis secondary to a 4mm Right UVJ stone, s/p R ureteral stent placement.  Hospital course notable for septic shock requiring ICU admission and resuscitation. This is a 61 year old male admitted with sepsis secondary to a 4mm Right UVJ stone, s/p R ureteral stent placement.  Hospital course notable for septic shock requiring ICU admission and resuscitation, transferred to the floor in stable condition.  Has remained afebrile and hemodynamically stable since transfer.  Successful TOV, Ucx, Bcx pan sensitive Ecoli, repeat Bcx NGTD.

## 2017-11-04 NOTE — DISCHARGE NOTE ADULT - INSTRUCTIONS
notify MD of temp 101, chills, nausea/vomiting, or difficulty urinating Drink plenty of fluids notify MD of temp 101, chills,  pain not relieved with pain medication. nausea/vomiting, difficulty urinating, or bloody urine with clots  make follow up appointment

## 2017-11-04 NOTE — PROGRESS NOTE ADULT - PROBLEM SELECTOR PROBLEM 2
Kidney stone on right side
R/O Kidney stone on right side
Type 2 diabetes mellitus without complication, unspecified long term insulin use status

## 2017-11-04 NOTE — DISCHARGE NOTE ADULT - PLAN OF CARE
Resolution Take antibiotics as prescribed until finished You may have intermittent pink tinged urine and slight flank pain when you urinate.  This is normal and due to the stent in your ureter.   If your urine becomes bright red or with clots, please call the office.  Call Dr. Moreno to schedule a follow up appointment for stone/stent removal and further management.  Call the office if you have fever greater than 101, difficulty urinating, pain not relieved with pain medication, nausea/vomiting.

## 2017-11-04 NOTE — DISCHARGE NOTE ADULT - HOSPITAL COURSE
60 yo M admitted with septic shock secondary to 4mm right UVJ stone, treated with IVF, pressor support, ceftriaxone and emergent ureteral stenting.  Pt transferred to SICU postoperatively.  Pt extubated POD #1, pressor support d/c and pt transferred to the floor POD #2 in stable condition.  Pt remained afebrile and hemodynamically stable for the remainder of his hospital stay. Ucx, Bcx pan sensitive Ecoli, repeat Bcx NGTD.  Pt d/c to complete 14 day course of keflex and to f/u with Dr. Moreno.

## 2017-11-04 NOTE — DISCHARGE NOTE ADULT - VISION (WITH CORRECTIVE LENSES IF THE PATIENT USUALLY WEARS THEM):
pt sedated and intubated Normal vision: sees adequately in most situations; can see medication labels, newsprint

## 2017-11-04 NOTE — PROGRESS NOTE ADULT - SUBJECTIVE AND OBJECTIVE BOX
Subjective  PVR 78 yesterday evening, voiding without difficulty  transferred to the floor from SICU yesterday    Objective    Vital signs  T(F): , Max: 100 (11-03-17 @ 21:39)  HR: 83 (11-04-17 @ 00:58)  BP: 128/73 (11-04-17 @ 00:58)  SpO2: 96% (11-04-17 @ 00:58)          Gen  Abd      Labs      11-03 @ 03:00    WBC 17.02 / Hct 33.2  / SCr 1.19     11-02 @ 11:15    WBC --    / Hct --    / SCr 1.61       Urine Cx:   Culture - Urine (11.01.17 @ 18:49)    Culture - Urine:   EC^Escherichia coli  COLONY COUNT: > = 100,000 CFU/ML    Specimen Source: URINE MIDSTREAM      Blood Cx: ?    Imaging Transferred to the floor from SICU yesterday, remained afebrile and hemodynamically stable, benjamin removed with PVR 78    Subjective  Pt offers no complaints      Objective    Vital signs  T(F): , Max: 100 (11-03-17 @ 21:39)  HR: 83 (11-04-17 @ 00:58)  BP: 128/73 (11-04-17 @ 00:58)  SpO2: 96% (11-04-17 @ 00:58)    Void: 1475      Gen: NAD  Abd: soft, nontender, no CVAT      Labs                          12.7   11.89 )-----------( 178      ( 04 Nov 2017 05:27 )             37.1     11-03 @ 03:00    WBC 17.02 / Hct 33.2  / SCr 1.19     11-02 @ 11:15    WBC --    / Hct --    / SCr 1.61       Urine Cx:   Culture - Urine (11.01.17 @ 18:49)    Culture - Urine:   EC^Escherichia coli  COLONY COUNT: > = 100,000 CFU/ML    Specimen Source: URINE MIDSTREAM      Blood Cx: Culture - Blood (11.03.17 @ 11:31)    Culture - Blood:   NO ORGANISMS ISOLATED  NO ORGANISMS ISOLATED AT 24 HOURS    Specimen Source: BLOOD VENOUS    Culture - Blood (11.03.17 @ 11:31)    Culture - Blood:   NO ORGANISMS ISOLATED  NO ORGANISMS ISOLATED AT 24 HOURS    Specimen Source: BLOOD PERIPHERAL    Culture - Blood (11.01.17 @ 18:02)    -  Escherichia coli: + DETECT IMAN    Culture - Blood:   ***Blood Panel PCR results on this specimen are available  approximately 3 hours after the Gram stain result***  Gram stain, PCR, and/or culture results may not always  correspond due to difference in methodologies  ------------------------------------------------------------  This PCR assay was performed using [employer].  The  following targets are tested for:  Enterococcus, vancomycin  resistant enterococci, Listeria monocytogenes,  coagulase  negative staphylococci, S. aureus, methicillin resistant S.  aureus, Streptococcus agalactiae (Group B), S. pneumoniae,  S. pyogenes (Group A), Acinetobacter baumannii, Enterobacter  cloacae, E. coli, Klebsiella oxytoca, K. pneumoniae, Proteus  sp., Serratia marcescens, Haemophilus influenzae, Neisseria  meningitidis, Pseudomonas aeruginosa, Candida albicans, C.  glabrata, C. krusei, C. parapsilosis, C. tropicalis and the  KPC resistance gene.    -  Ampicillin: S <=8 IMAN    -  Amikacin: S <=16 IMAN    -  Trimethoprim/Sulfamethoxazole: S <=2/38 IMAN    -  Tobramycin: S <=4 IMAN    -  Tigecycline: S <=2 IMAN    -  Levofloxacin: S <=2 IMAN    -  Piperacillin/Tazobactam: S <=16 IMAN    -  Imipenem: S <=1 IMAN    -  Gentamicin: S <=4 IMAN    -  Ertapenem: S <=1 IMAN    -  Ceftriaxone: S <=1 IMAN    -  Cefuroxime: S <=4 IMAN    -  Ceftazidime: S <=1 IMAN    -  Cefoxitin: S <=8 IMAN    -  Cefepime: S <=4 IMAN    -  Cefazolin: S <=8 IMAN    -  Aztreonam: S <=4 IMAN    -  Ampicillin/Sulbactam: S <=8/4 IMAN    Specimen Source: BLOOD PERIPHERAL    Organism: BLOOD CULTURE PCR    Organism: Escherichia coli    Gram Stain Blood:   ***** CRITICAL RESULT *****  Culture - Urine (11.01.17 @ 18:49)    -  Amikacin: S <=16 IMNA    -  Ampicillin: S <=8 IMAN    -  Ampicillin/Sulbactam: S <=8/4 IMAN    -  Aztreonam: S <=4 IMAN    -  Cefazolin: S <=8 IMAN    -  Cefepime: S <=4 IMAN    -  Cefoxitin: S <=8 IMAN    -  Ceftazidime: S <=1 IMAN    -  Ceftriaxone: S <=1 IMAN    -  Ciprofloxacin: S <=1 IMAN    -  Ertapenem: S <=1 IMAN    -  Gentamicin: S <=4 IMAN    -  Imipenem: S <=1 IMAN    -  Meropenem: S <=1 IMAN    -  Nitrofurantoin: S <=32 IMAN    -  Piperacillin/Tazobactam: S <=16 IMAN    -  Levofloxacin: S <=2 IMAN    -  Tigecycline: S <=2 IMAN    -  Tobramycin: S <=4 IMAN    -  Trimethoprim/Sulfamethoxazole: S <=2/38 IMAN    Culture - Urine:   COLONY COUNT: > = 100,000 CFU/ML    Specimen Source: URINE MIDSTREAM    Organism Identification: Escherichia coli    Organism: Escherichia coli    Method Type: MICROSCAN NEG URINE COMBO 61    PERSON CALLED / READ-BACK: SANTINO MCKEON MD/TEJA  DATE / TIME CALLED: 11/02/17 1100  CALLED BY: ARIAN DAN                *******************************                * This is an appended result. *        *******************************  A prior result that was reported as final has been changed.                *******************************                * This is an appended result. *                *******************************  A prior result that was reported as final has been changed.  GNR^Gram Neg Rods  AFTER: 32 HOURS INCUBATION  BOTTLE: AEROBIC   ANAEROBIC BOTTLES    Organism Identification: BLOOD CULTURE PCR  Escherichia coli    Method Type: PCR    Method Type: MICROSCAN NEG URINE COMBO 61        Imaging

## 2017-11-04 NOTE — PROGRESS NOTE ADULT - PROBLEM SELECTOR PLAN 1
- continue ceftriaxone, awaiting sensitivites for urine cultures  - AM CBC, follow up urine culture - Discharge on keflex to complete 2 week course  - F/U with Dr. Moreno

## 2017-11-04 NOTE — PROGRESS NOTE ADULT - ASSESSMENT
61M hx of HTN, T2DM, Nephrolithiasis was admitted to [facility] for septic shock 2/2 infected right UVJ stone s/p right ureteral stent placement, blood /urine cx 11/1 e.coli, repeat cx negative, resolving on ceftriaxone

## 2017-11-04 NOTE — PROGRESS NOTE ADULT - PROBLEM SELECTOR PLAN 1
-due to 4 mm right UVJ stone, s/p right ureteral stent  -urine/blood cx 11/1 e.coli  -resolving on ceftriaxone  -d/c home today on keflex x2 wk course as per primary team

## 2017-11-04 NOTE — DISCHARGE NOTE ADULT - CARE PLAN
Principal Discharge DX:	Sepsis  Goal:	Resolution  Instructions for follow-up, activity and diet:	Take antibiotics as prescribed until finished  Secondary Diagnosis:	Kidney stone on right side  Instructions for follow-up, activity and diet:	You may have intermittent pink tinged urine and slight flank pain when you urinate.  This is normal and due to the stent in your ureter.   If your urine becomes bright red or with clots, please call the office.  Call Dr. Moreno to schedule a follow up appointment for stone/stent removal and further management.  Call the office if you have fever greater than 101, difficulty urinating, pain not relieved with pain medication, nausea/vomiting.  Secondary Diagnosis:	DM (diabetes mellitus)

## 2017-11-04 NOTE — PROGRESS NOTE ADULT - PROBLEM SELECTOR PLAN 2
flomax  pain control
- continue diabetic diet, SSI while in hospital  -
right UVJ stone as above, s/p stent, f/u urology as outpt

## 2017-11-04 NOTE — DISCHARGE NOTE ADULT - NS AS ACTIVITY OBS
Walking-Indoors allowed/Stairs allowed/Showering allowed/No Heavy lifting/straining/Walking-Outdoors allowed

## 2017-11-04 NOTE — PROGRESS NOTE ADULT - SUBJECTIVE AND OBJECTIVE BOX
CHIEF COMPLAINT: Patient is a 61y old  male who presents with a chief complaint of Sepsis (04 Nov 2017 12:31)      SUBJECTIVE / OVERNIGHT EVENTS:  pt feels well, going home today, denies chest pain or sob    MEDICATIONS  (STANDING):  cefTRIAXone   IVPB      dextrose 5%. 1000 milliLiter(s) (50 mL/Hr) IV Continuous <Continuous>  dextrose 50% Injectable 12.5 Gram(s) IV Push once  dextrose 50% Injectable 25 Gram(s) IV Push once  dextrose 50% Injectable 25 Gram(s) IV Push once  docusate sodium 100 milliGRAM(s) Oral three times a day  heparin  Injectable 5000 Unit(s) SubCutaneous every 8 hours  insulin lispro (HumaLOG) corrective regimen sliding scale   SubCutaneous three times a day before meals  insulin lispro (HumaLOG) corrective regimen sliding scale   SubCutaneous at bedtime  senna 2 Tablet(s) Oral at bedtime  tamsulosin 0.4 milliGRAM(s) Oral at bedtime    MEDICATIONS  (PRN):  acetaminophen   Tablet 650 milliGRAM(s) Oral every 6 hours PRN For Temp greater than 38 C (100.4 F)  acetaminophen   Tablet. 650 milliGRAM(s) Oral every 6 hours PRN Mild Pain (1 - 3)  dextrose Gel 1 Dose(s) Oral once PRN Blood Glucose LESS THAN 70 milliGRAM(s)/deciliter  glucagon  Injectable 1 milliGRAM(s) IntraMuscular once PRN Glucose LESS THAN 70 milligrams/deciliter  oxyCODONE    IR 5 milliGRAM(s) Oral every 4 hours PRN moderate to severe pain      VITALS:  T(F): 97.5 (11-04-17 @ 08:00), Max: 100 (11-03-17 @ 21:39)  HR: 77 (11-04-17 @ 08:00) (77 - 83)  BP: 131/77 (11-04-17 @ 08:00) (128/73 - 144/75)  RR: 16 (11-04-17 @ 08:00) (16 - 17)  SpO2: 94% (11-04-17 @ 08:00)      CAPILLARY BLOOD GLUCOSE    Output     I&O's Summary  T(F): 97.5 (11-04-17 @ 08:00), Max: 100 (11-03-17 @ 21:39)  HR: 77 (11-04-17 @ 08:00) (77 - 83)  BP: 131/77 (11-04-17 @ 08:00) (128/73 - 144/75)  RR: 16 (11-04-17 @ 08:00) (16 - 17)  SpO2: 94% (11-04-17 @ 08:00)    PHYSICAL EXAM:  GENERAL: NAD, well-developed  HEAD:  Atraumatic, Normocephalic  EYES: EOMI, PERRLA, conjunctiva and sclera clear  NECK: Supple, No JVD  CHEST/LUNG: Clear to auscultation bilaterally; No wheeze  HEART: Regular rate and rhythm; No murmurs, rubs, or gallops  ABDOMEN: Soft, Nontender, Nondistended; Bowel sounds present  EXTREMITIES:  2+ Peripheral Pulses, No clubbing, cyanosis, or edema  PSYCH: AAOx3  NEUROLOGY: non-focal  SKIN: No rashes or lesions    LABS:              12.7                 x    | x    | x            11.89 >-----------< 178     ------------------------< x                     37.1                 x    | x    | x                                            Ca x     Mg x     Ph x            INR: 1.22<H>;    PT: 13.7 SEC<H>;    PTT: 28.2 SEC    ABG - ( 02 Nov 2017 15:32 )  pH: 7.37  /  pCO2: 29    /  pO2: 172   / HCO3: 18    / Base Excess: -8.2  /  SaO2: 98.9      MICROBIOLOGY:    Culture - Blood (collected 03 Nov 2017 11:31)  Source: BLOOD VENOUS  Preliminary Report (04 Nov 2017 11:30):    NO ORGANISMS ISOLATED    NO ORGANISMS ISOLATED AT 24 HOURS    Culture - Blood (collected 03 Nov 2017 11:31)  Source: BLOOD PERIPHERAL  Preliminary Report (04 Nov 2017 11:30):    NO ORGANISMS ISOLATED    NO ORGANISMS ISOLATED AT 24 HOURS    Culture - Urine (collected 01 Nov 2017 18:49)  Source: URINE MIDSTREAM  Final Report (04 Nov 2017 10:18):    COLONY COUNT: > = 100,000 CFU/ML  Organism: Escherichia coli (04 Nov 2017 10:18)  Organism: Escherichia coli (04 Nov 2017 10:18)    Culture - Blood (collected 01 Nov 2017 18:02)  Source: BLOOD VENOUS  Preliminary Report (04 Nov 2017 09:55):    EC^Escherichia coli  Preliminary Report (02 Nov 2017 18:00):    NO ORGANISMS ISOLATED    NO ORGANISMS ISOLATED AT 24 HOURS    Culture - Blood (collected 01 Nov 2017 18:02)  Source: BLOOD PERIPHERAL  Preliminary Report (03 Nov 2017 08:19):    ***Blood Panel PCR results on this specimen are available    approximately 3 hours after the Gram stain result***    Gram stain, PCR, and/or culture results may not always    correspond due to difference in methodologies    ------------------------------------------------------------    This PCR assay was performed using [employer].  The    following targets are tested for:  Enterococcus, vancomycin    resistant enterococci, Listeria monocytogenes,  coagulase    negative staphylococci, S. aureus, methicillin resistant S.    aureus, Streptococcus agalactiae (Group B), S. pneumoniae,    S. pyogenes (Group A), Acinetobacter baumannii, Enterobacter    cloacae, E. coli, Klebsiella oxytoca, K. pneumoniae, Proteus    sp., Serratia marcescens, Haemophilus influenzae, Neisseria    meningitidis, Pseudomonas aeruginosa, Candida albicans, C.    glabrata, C. krusei, C. parapsilosis, C. tropicalis and the    KPC resistance gene.  Organism: BLOOD CULTURE PCR  Escherichia coli (03 Nov 2017 08:19)  Organism: Escherichia coli (03 Nov 2017 08:19)  Organism: BLOOD CULTURE PCR (02 Nov 2017 13:45)          RADIOLOGY & ADDITIONAL TESTS:    Imaging Personally Reviewed:    [ ] Consultant(s) Notes Reviewed:  [ ] Care Discussed with Consultants/Other Providers:

## 2017-11-04 NOTE — DISCHARGE NOTE ADULT - PATIENT PORTAL LINK FT
“You can access the FollowHealth Patient Portal, offered by [facility], by registering with the following website: [email or web address]”

## 2017-11-04 NOTE — DISCHARGE NOTE ADULT - ABILITY TO HEAR (WITH HEARING AID OR HEARING APPLIANCE IF NORMALLY USED):
pt sedated and intubated/Unable to assess hearing Adequate: hears normal conversation without difficulty

## 2017-11-05 LAB
-  AMIKACIN: SIGNIFICANT CHANGE UP
-  AMPICILLIN/SULBACTAM: SIGNIFICANT CHANGE UP
-  AMPICILLIN: SIGNIFICANT CHANGE UP
-  AZTREONAM: SIGNIFICANT CHANGE UP
-  CEFAZOLIN: SIGNIFICANT CHANGE UP
-  CEFEPIME: SIGNIFICANT CHANGE UP
-  CEFOXITIN: SIGNIFICANT CHANGE UP
-  CEFTAZIDIME: SIGNIFICANT CHANGE UP
-  CEFTRIAXONE: SIGNIFICANT CHANGE UP
-  CEFUROXIME: SIGNIFICANT CHANGE UP
-  CIPROFLOXACIN: SIGNIFICANT CHANGE UP
-  ERTAPENEM: SIGNIFICANT CHANGE UP
-  GENTAMICIN: SIGNIFICANT CHANGE UP
-  IMIPENEM: SIGNIFICANT CHANGE UP
-  LEVOFLOXACIN: SIGNIFICANT CHANGE UP
-  MEROPENEM: SIGNIFICANT CHANGE UP
-  PIPERACILLIN/TAZOBACTAM: SIGNIFICANT CHANGE UP
-  TIGECYCLINE: SIGNIFICANT CHANGE UP
-  TOBRAMYCIN: SIGNIFICANT CHANGE UP
-  TRIMETHOPRIM/SULFAMETHOXAZOLE: SIGNIFICANT CHANGE UP
BACTERIA BLD CULT: SIGNIFICANT CHANGE UP
BACTERIA BLD CULT: SIGNIFICANT CHANGE UP
E COLI DNA BLD POS QL NAA+NON-PROBE: SIGNIFICANT CHANGE UP
METHOD TYPE: SIGNIFICANT CHANGE UP
METHOD TYPE: SIGNIFICANT CHANGE UP
ORGANISM # SPEC MICROSCOPIC CNT: SIGNIFICANT CHANGE UP

## 2017-11-06 PROBLEM — E11.9 TYPE 2 DIABETES MELLITUS WITHOUT COMPLICATIONS: Chronic | Status: ACTIVE | Noted: 2017-11-01

## 2017-11-06 PROBLEM — I10 ESSENTIAL (PRIMARY) HYPERTENSION: Chronic | Status: ACTIVE | Noted: 2017-11-01

## 2017-11-06 LAB — BACTERIA BLD CULT: SIGNIFICANT CHANGE UP

## 2017-11-08 LAB
BACTERIA BLD CULT: SIGNIFICANT CHANGE UP
BACTERIA BLD CULT: SIGNIFICANT CHANGE UP

## 2017-11-13 ENCOUNTER — APPOINTMENT (OUTPATIENT)
Dept: UROLOGY | Facility: CLINIC | Age: 62
End: 2017-11-13
Payer: COMMERCIAL

## 2017-11-13 VITALS
OXYGEN SATURATION: 96 % | TEMPERATURE: 98.4 F | DIASTOLIC BLOOD PRESSURE: 78 MMHG | HEART RATE: 95 BPM | SYSTOLIC BLOOD PRESSURE: 132 MMHG

## 2017-11-13 DIAGNOSIS — Z86.39 PERSONAL HISTORY OF OTHER ENDOCRINE, NUTRITIONAL AND METABOLIC DISEASE: ICD-10-CM

## 2017-11-13 PROCEDURE — 99213 OFFICE O/P EST LOW 20 MIN: CPT

## 2017-11-13 RX ORDER — METOPROLOL SUCCINATE 25 MG/1
25 TABLET, EXTENDED RELEASE ORAL
Refills: 0 | Status: ACTIVE | COMMUNITY

## 2017-11-13 RX ORDER — LOSARTAN POTASSIUM 100 MG/1
100 TABLET, FILM COATED ORAL
Refills: 0 | Status: ACTIVE | COMMUNITY

## 2017-11-13 RX ORDER — FENOFIBRATE 134 MG/1
134 CAPSULE ORAL
Refills: 0 | Status: ACTIVE | COMMUNITY

## 2017-11-13 RX ORDER — GLYBURIDE 5 MG/1
5 TABLET ORAL
Refills: 0 | Status: ACTIVE | COMMUNITY

## 2017-11-13 RX ORDER — METFORMIN HYDROCHLORIDE 1000 MG/1
1000 TABLET, COATED ORAL
Refills: 0 | Status: ACTIVE | COMMUNITY

## 2017-11-15 LAB — BACTERIA UR CULT: NORMAL

## 2017-11-20 ENCOUNTER — MESSAGE (OUTPATIENT)
Age: 62
End: 2017-11-20

## 2017-11-20 ENCOUNTER — INPATIENT (INPATIENT)
Facility: HOSPITAL | Age: 62
LOS: 2 days | Discharge: HOME | End: 2017-11-23
Attending: UROLOGY | Admitting: UROLOGY
Payer: COMMERCIAL

## 2017-11-20 VITALS
RESPIRATION RATE: 20 BRPM | HEART RATE: 112 BPM | OXYGEN SATURATION: 98 % | DIASTOLIC BLOOD PRESSURE: 57 MMHG | SYSTOLIC BLOOD PRESSURE: 107 MMHG | TEMPERATURE: 99 F

## 2017-11-20 DIAGNOSIS — Z98.890 OTHER SPECIFIED POSTPROCEDURAL STATES: Chronic | ICD-10-CM

## 2017-11-20 DIAGNOSIS — Z90.49 ACQUIRED ABSENCE OF OTHER SPECIFIED PARTS OF DIGESTIVE TRACT: Chronic | ICD-10-CM

## 2017-11-20 LAB
ALBUMIN SERPL ELPH-MCNC: 3.6 G/DL — SIGNIFICANT CHANGE UP (ref 3.3–5)
ALP SERPL-CCNC: 37 U/L — LOW (ref 40–120)
ALT FLD-CCNC: 16 U/L — SIGNIFICANT CHANGE UP (ref 4–41)
APPEARANCE UR: SIGNIFICANT CHANGE UP
AST SERPL-CCNC: 16 U/L — SIGNIFICANT CHANGE UP (ref 4–40)
BACTERIA # UR AUTO: HIGH
BASE EXCESS BLDV CALC-SCNC: 3.7 MMOL/L — SIGNIFICANT CHANGE UP
BASOPHILS # BLD AUTO: 0.02 K/UL — SIGNIFICANT CHANGE UP (ref 0–0.2)
BASOPHILS NFR BLD AUTO: 0.1 % — SIGNIFICANT CHANGE UP (ref 0–2)
BILIRUB SERPL-MCNC: 1 MG/DL — SIGNIFICANT CHANGE UP (ref 0.2–1.2)
BILIRUB UR-MCNC: NEGATIVE — SIGNIFICANT CHANGE UP
BLOOD GAS VENOUS - CREATININE: 2.03 MG/DL — HIGH (ref 0.5–1.3)
BLOOD UR QL VISUAL: HIGH
BUN SERPL-MCNC: 30 MG/DL — HIGH (ref 7–23)
CALCIUM SERPL-MCNC: 9 MG/DL — SIGNIFICANT CHANGE UP (ref 8.4–10.5)
CHLORIDE BLDV-SCNC: 102 MMOL/L — SIGNIFICANT CHANGE UP (ref 96–108)
CHLORIDE SERPL-SCNC: 97 MMOL/L — LOW (ref 98–107)
CO2 SERPL-SCNC: 22 MMOL/L — SIGNIFICANT CHANGE UP (ref 22–31)
COLOR SPEC: YELLOW — SIGNIFICANT CHANGE UP
CREAT SERPL-MCNC: 2.02 MG/DL — HIGH (ref 0.5–1.3)
EOSINOPHIL # BLD AUTO: 0.09 K/UL — SIGNIFICANT CHANGE UP (ref 0–0.5)
EOSINOPHIL NFR BLD AUTO: 0.6 % — SIGNIFICANT CHANGE UP (ref 0–6)
GAS PNL BLDV: 130 MMOL/L — LOW (ref 136–146)
GLUCOSE BLDV-MCNC: 180 — HIGH (ref 70–99)
GLUCOSE SERPL-MCNC: 170 MG/DL — HIGH (ref 70–99)
GLUCOSE UR-MCNC: 250 — SIGNIFICANT CHANGE UP
HCO3 BLDV-SCNC: 27 MMOL/L — SIGNIFICANT CHANGE UP (ref 20–27)
HCT VFR BLD CALC: 36.4 % — LOW (ref 39–50)
HCT VFR BLDV CALC: 39.9 % — SIGNIFICANT CHANGE UP (ref 39–51)
HGB BLD-MCNC: 12.5 G/DL — LOW (ref 13–17)
HGB BLDV-MCNC: 13 G/DL — SIGNIFICANT CHANGE UP (ref 13–17)
IMM GRANULOCYTES # BLD AUTO: 0.19 # — SIGNIFICANT CHANGE UP
IMM GRANULOCYTES NFR BLD AUTO: 1.3 % — SIGNIFICANT CHANGE UP (ref 0–1.5)
KETONES UR-MCNC: NEGATIVE — SIGNIFICANT CHANGE UP
LACTATE BLDV-MCNC: 1.9 MMOL/L — SIGNIFICANT CHANGE UP (ref 0.5–2)
LEUKOCYTE ESTERASE UR-ACNC: HIGH
LYMPHOCYTES # BLD AUTO: 1.04 K/UL — SIGNIFICANT CHANGE UP (ref 1–3.3)
LYMPHOCYTES # BLD AUTO: 6.9 % — LOW (ref 13–44)
MCHC RBC-ENTMCNC: 30.3 PG — SIGNIFICANT CHANGE UP (ref 27–34)
MCHC RBC-ENTMCNC: 34.3 % — SIGNIFICANT CHANGE UP (ref 32–36)
MCV RBC AUTO: 88.3 FL — SIGNIFICANT CHANGE UP (ref 80–100)
MONOCYTES # BLD AUTO: 0.67 K/UL — SIGNIFICANT CHANGE UP (ref 0–0.9)
MONOCYTES NFR BLD AUTO: 4.4 % — SIGNIFICANT CHANGE UP (ref 2–14)
MUCOUS THREADS # UR AUTO: SIGNIFICANT CHANGE UP
NEUTROPHILS # BLD AUTO: 13.07 K/UL — HIGH (ref 1.8–7.4)
NEUTROPHILS NFR BLD AUTO: 86.7 % — HIGH (ref 43–77)
NITRITE UR-MCNC: NEGATIVE — SIGNIFICANT CHANGE UP
NRBC # FLD: 0 — SIGNIFICANT CHANGE UP
PCO2 BLDV: 41 MMHG — SIGNIFICANT CHANGE UP (ref 41–51)
PH BLDV: 7.45 PH — HIGH (ref 7.32–7.43)
PH UR: 6 — SIGNIFICANT CHANGE UP (ref 4.6–8)
PLATELET # BLD AUTO: 114 K/UL — LOW (ref 150–400)
PMV BLD: 10.7 FL — SIGNIFICANT CHANGE UP (ref 7–13)
PO2 BLDV: 32 MMHG — LOW (ref 35–40)
POTASSIUM BLDV-SCNC: 3.3 MMOL/L — LOW (ref 3.4–4.5)
POTASSIUM SERPL-MCNC: 3.5 MMOL/L — SIGNIFICANT CHANGE UP (ref 3.5–5.3)
POTASSIUM SERPL-SCNC: 3.5 MMOL/L — SIGNIFICANT CHANGE UP (ref 3.5–5.3)
PROT SERPL-MCNC: 7.2 G/DL — SIGNIFICANT CHANGE UP (ref 6–8.3)
PROT UR-MCNC: 100 — SIGNIFICANT CHANGE UP
RBC # BLD: 4.12 M/UL — LOW (ref 4.2–5.8)
RBC # FLD: 13.5 % — SIGNIFICANT CHANGE UP (ref 10.3–14.5)
RBC CASTS # UR COMP ASSIST: HIGH (ref 0–?)
SAO2 % BLDV: 60 % — SIGNIFICANT CHANGE UP (ref 60–85)
SODIUM SERPL-SCNC: 137 MMOL/L — SIGNIFICANT CHANGE UP (ref 135–145)
SP GR SPEC: 1.02 — SIGNIFICANT CHANGE UP (ref 1–1.03)
UROBILINOGEN FLD QL: 1 E.U. — SIGNIFICANT CHANGE UP (ref 0.1–0.2)
WBC # BLD: 15.08 K/UL — HIGH (ref 3.8–10.5)
WBC # FLD AUTO: 15.08 K/UL — HIGH (ref 3.8–10.5)
WBC UR QL: >50 — HIGH (ref 0–?)

## 2017-11-20 RX ORDER — SODIUM CHLORIDE 9 MG/ML
2000 INJECTION INTRAMUSCULAR; INTRAVENOUS; SUBCUTANEOUS ONCE
Qty: 0 | Refills: 0 | Status: COMPLETED | OUTPATIENT
Start: 2017-11-20 | End: 2017-11-20

## 2017-11-20 RX ORDER — CEFTRIAXONE 500 MG/1
1 INJECTION, POWDER, FOR SOLUTION INTRAMUSCULAR; INTRAVENOUS ONCE
Qty: 0 | Refills: 0 | Status: COMPLETED | OUTPATIENT
Start: 2017-11-20 | End: 2017-11-20

## 2017-11-20 RX ADMIN — CEFTRIAXONE 100 GRAM(S): 500 INJECTION, POWDER, FOR SOLUTION INTRAMUSCULAR; INTRAVENOUS at 22:17

## 2017-11-20 RX ADMIN — SODIUM CHLORIDE 2000 MILLILITER(S): 9 INJECTION INTRAMUSCULAR; INTRAVENOUS; SUBCUTANEOUS at 21:43

## 2017-11-20 NOTE — ED PROVIDER NOTE - MEDICAL DECISION MAKING DETAILS
62 yo M with fever, N/V in setting of recent renal stone and stent, tachycardia and borderline low BP on exam, well appearing-CBC, CMP, lactate, UA, Ucx, blood cultures, IVF bolus, CT renal stone hunt, urology to see

## 2017-11-20 NOTE — ED PROVIDER NOTE - OBJECTIVE STATEMENT
60 yo M with history of right renal stone s/p stent by Dr. Moreno on November 1 presenting with nausea/vomiting since yesterday and fever today (Tmax 101.8 orally at 7 pm, took Advil at that time, states thinks may have had fever yesterday but was taking axillary temp prior to today). Emesis 1 x yesterday and 1 x this morning, NBNB, states no longer feeling nauseated. Endorses lost of appetite. States he is feeling a little better currently. Patient states he is not having any pain or dysuria, but was concerned fever was related to stone so called urologist tonight and was told to come to ED. No back pain. No URI symptoms, CP, SOB, cough, diarrhea.

## 2017-11-20 NOTE — ED ADULT TRIAGE NOTE - CHIEF COMPLAINT QUOTE
Patient ambulatory to triage and appears comfortable and in no apparent distress.  Sent to ED by Urologist for evaluation.  Complaining of fever since yesterday and worsen tonight. History of right kidney stone with stent placed on 1 Nov.  Scheduled for stent removal on Nov 28.  Took Advil at home at 1900 for temp. Complaining of dysuria, nausea, vomiting and decreased appetite.  Denies chest pain, SOB, chills or diarrhea. Patient ambulatory to triage and appears comfortable and in no apparent distress.  Sent to ED by Urologist for evaluation.  Complaining of fever since yesterday and worse tonight. History of right kidney stone with stent placed on 1 Nov.  Scheduled for stent removal on Nov 28.  Took Advil at home at 1900 for fever. Complaining of dysuria, nausea, vomiting and decreased appetite.  Denies chest pain, SOB, chills or diarrhea.

## 2017-11-20 NOTE — ED PROVIDER NOTE - NOTES
Spoke to urology resident who recommended CT renal stone hunt to eval position of stent and stone burden. Stated Dr. Moreno on call and will see patient

## 2017-11-20 NOTE — ED ADULT NURSE NOTE - OBJECTIVE STATEMENT
pt AO x3, ambulatory, c/o fever and n/v since yesterday. As per pt, he did stent early this month and experienced fever right after procedure. 100.2F on arrival. Denies chest pain, dizziness, SOB and actual vomiting. Evaluated by provider. Blood and urine obtained and sent to LAB. IV inserted. Right AC 20G. Cardiac monitor in place/sinus. will continue to monitor.

## 2017-11-20 NOTE — ED PROVIDER NOTE - ATTENDING CONTRIBUTION TO CARE
shauna: past hx includes urosepsis related to rt kidney stone. Emergent rt stent placement nov 1.   Past two days, notes fevers; oral temp to 101.8 today.   increasing weaknesss; poor appetite.   exam: . . Pt alert, oriented, cooperative. RT CVAt. exam otherwsie unremarkable.  impression: likely urosepsis.   recc: fluids, antibiotics, culture pre-rx; imaging as per urology.

## 2017-11-20 NOTE — ED ADULT NURSE NOTE - CHIEF COMPLAINT QUOTE
Patient ambulatory to triage and appears comfortable and in no apparent distress.  Sent to ED by Urologist for evaluation.  Complaining of fever since yesterday and worse tonight. History of right kidney stone with stent placed on 1 Nov.  Scheduled for stent removal on Nov 28.  Took Advil at home at 1900 for fever. Complaining of dysuria, nausea, vomiting and decreased appetite.  Denies chest pain, SOB, chills or diarrhea.

## 2017-11-21 DIAGNOSIS — Z29.9 ENCOUNTER FOR PROPHYLACTIC MEASURES, UNSPECIFIED: ICD-10-CM

## 2017-11-21 DIAGNOSIS — N39.0 URINARY TRACT INFECTION, SITE NOT SPECIFIED: ICD-10-CM

## 2017-11-21 DIAGNOSIS — I10 ESSENTIAL (PRIMARY) HYPERTENSION: ICD-10-CM

## 2017-11-21 DIAGNOSIS — A41.9 SEPSIS, UNSPECIFIED ORGANISM: ICD-10-CM

## 2017-11-21 DIAGNOSIS — N17.9 ACUTE KIDNEY FAILURE, UNSPECIFIED: ICD-10-CM

## 2017-11-21 DIAGNOSIS — E11.9 TYPE 2 DIABETES MELLITUS WITHOUT COMPLICATIONS: ICD-10-CM

## 2017-11-21 LAB
BUN SERPL-MCNC: 25 MG/DL — HIGH (ref 7–23)
CALCIUM SERPL-MCNC: 8.8 MG/DL — SIGNIFICANT CHANGE UP (ref 8.4–10.5)
CHLORIDE SERPL-SCNC: 101 MMOL/L — SIGNIFICANT CHANGE UP (ref 98–107)
CO2 SERPL-SCNC: 23 MMOL/L — SIGNIFICANT CHANGE UP (ref 22–31)
CREAT SERPL-MCNC: 1.6 MG/DL — HIGH (ref 0.5–1.3)
GLUCOSE BLDC GLUCOMTR-MCNC: 118 MG/DL — HIGH (ref 70–99)
GLUCOSE BLDC GLUCOMTR-MCNC: 202 MG/DL — HIGH (ref 70–99)
GLUCOSE BLDC GLUCOMTR-MCNC: 81 MG/DL — SIGNIFICANT CHANGE UP (ref 70–99)
GLUCOSE BLDC GLUCOMTR-MCNC: 98 MG/DL — SIGNIFICANT CHANGE UP (ref 70–99)
GLUCOSE SERPL-MCNC: 116 MG/DL — HIGH (ref 70–99)
HCT VFR BLD CALC: 34.7 % — LOW (ref 39–50)
HGB BLD-MCNC: 11.5 G/DL — LOW (ref 13–17)
MCHC RBC-ENTMCNC: 30.3 PG — SIGNIFICANT CHANGE UP (ref 27–34)
MCHC RBC-ENTMCNC: 33.1 % — SIGNIFICANT CHANGE UP (ref 32–36)
MCV RBC AUTO: 91.3 FL — SIGNIFICANT CHANGE UP (ref 80–100)
METHOD TYPE: SIGNIFICANT CHANGE UP
NRBC # FLD: 0 — SIGNIFICANT CHANGE UP
ORGANISM # SPEC MICROSCOPIC CNT: SIGNIFICANT CHANGE UP
ORGANISM # SPEC MICROSCOPIC CNT: SIGNIFICANT CHANGE UP
PLATELET # BLD AUTO: 107 K/UL — LOW (ref 150–400)
PMV BLD: 11 FL — SIGNIFICANT CHANGE UP (ref 7–13)
POTASSIUM SERPL-MCNC: 4.1 MMOL/L — SIGNIFICANT CHANGE UP (ref 3.5–5.3)
POTASSIUM SERPL-SCNC: 4.1 MMOL/L — SIGNIFICANT CHANGE UP (ref 3.5–5.3)
RBC # BLD: 3.8 M/UL — LOW (ref 4.2–5.8)
RBC # FLD: 13.6 % — SIGNIFICANT CHANGE UP (ref 10.3–14.5)
SODIUM SERPL-SCNC: 139 MMOL/L — SIGNIFICANT CHANGE UP (ref 135–145)
SPECIMEN SOURCE: SIGNIFICANT CHANGE UP
SPECIMEN SOURCE: SIGNIFICANT CHANGE UP
WBC # BLD: 10.56 K/UL — HIGH (ref 3.8–10.5)
WBC # FLD AUTO: 10.56 K/UL — HIGH (ref 3.8–10.5)

## 2017-11-21 PROCEDURE — 99222 1ST HOSP IP/OBS MODERATE 55: CPT

## 2017-11-21 PROCEDURE — 99223 1ST HOSP IP/OBS HIGH 75: CPT

## 2017-11-21 PROCEDURE — 71010: CPT | Mod: 26

## 2017-11-21 PROCEDURE — 74176 CT ABD & PELVIS W/O CONTRAST: CPT | Mod: 26,GC

## 2017-11-21 RX ORDER — INSULIN LISPRO 100/ML
VIAL (ML) SUBCUTANEOUS
Qty: 0 | Refills: 0 | Status: DISCONTINUED | OUTPATIENT
Start: 2017-11-21 | End: 2017-11-23

## 2017-11-21 RX ORDER — MORPHINE SULFATE 50 MG/1
2 CAPSULE, EXTENDED RELEASE ORAL EVERY 4 HOURS
Qty: 0 | Refills: 0 | Status: DISCONTINUED | OUTPATIENT
Start: 2017-11-21 | End: 2017-11-23

## 2017-11-21 RX ORDER — INSULIN LISPRO 100/ML
VIAL (ML) SUBCUTANEOUS AT BEDTIME
Qty: 0 | Refills: 0 | Status: DISCONTINUED | OUTPATIENT
Start: 2017-11-21 | End: 2017-11-23

## 2017-11-21 RX ORDER — LOSARTAN POTASSIUM 100 MG/1
100 TABLET, FILM COATED ORAL DAILY
Qty: 0 | Refills: 0 | Status: DISCONTINUED | OUTPATIENT
Start: 2017-11-21 | End: 2017-11-21

## 2017-11-21 RX ORDER — SODIUM CHLORIDE 9 MG/ML
1000 INJECTION, SOLUTION INTRAVENOUS
Qty: 0 | Refills: 0 | Status: DISCONTINUED | OUTPATIENT
Start: 2017-11-21 | End: 2017-11-23

## 2017-11-21 RX ORDER — DOCUSATE SODIUM 100 MG
100 CAPSULE ORAL THREE TIMES A DAY
Qty: 0 | Refills: 0 | Status: DISCONTINUED | OUTPATIENT
Start: 2017-11-21 | End: 2017-11-23

## 2017-11-21 RX ORDER — HEPARIN SODIUM 5000 [USP'U]/ML
5000 INJECTION INTRAVENOUS; SUBCUTANEOUS EVERY 8 HOURS
Qty: 0 | Refills: 0 | Status: DISCONTINUED | OUTPATIENT
Start: 2017-11-21 | End: 2017-11-23

## 2017-11-21 RX ORDER — CEFTRIAXONE 500 MG/1
1 INJECTION, POWDER, FOR SOLUTION INTRAMUSCULAR; INTRAVENOUS EVERY 24 HOURS
Qty: 0 | Refills: 0 | Status: DISCONTINUED | OUTPATIENT
Start: 2017-11-21 | End: 2017-11-23

## 2017-11-21 RX ORDER — METOPROLOL TARTRATE 50 MG
25 TABLET ORAL DAILY
Qty: 0 | Refills: 0 | Status: DISCONTINUED | OUTPATIENT
Start: 2017-11-21 | End: 2017-11-23

## 2017-11-21 RX ORDER — TAMSULOSIN HYDROCHLORIDE 0.4 MG/1
0.4 CAPSULE ORAL AT BEDTIME
Qty: 0 | Refills: 0 | Status: DISCONTINUED | OUTPATIENT
Start: 2017-11-21 | End: 2017-11-23

## 2017-11-21 RX ORDER — ACETAMINOPHEN 500 MG
650 TABLET ORAL EVERY 6 HOURS
Qty: 0 | Refills: 0 | Status: DISCONTINUED | OUTPATIENT
Start: 2017-11-21 | End: 2017-11-23

## 2017-11-21 RX ORDER — DEXTROSE 50 % IN WATER 50 %
25 SYRINGE (ML) INTRAVENOUS ONCE
Qty: 0 | Refills: 0 | Status: DISCONTINUED | OUTPATIENT
Start: 2017-11-21 | End: 2017-11-23

## 2017-11-21 RX ORDER — OXYCODONE HYDROCHLORIDE 5 MG/1
10 TABLET ORAL EVERY 6 HOURS
Qty: 0 | Refills: 0 | Status: DISCONTINUED | OUTPATIENT
Start: 2017-11-21 | End: 2017-11-23

## 2017-11-21 RX ORDER — GLUCAGON INJECTION, SOLUTION 0.5 MG/.1ML
1 INJECTION, SOLUTION SUBCUTANEOUS ONCE
Qty: 0 | Refills: 0 | Status: DISCONTINUED | OUTPATIENT
Start: 2017-11-21 | End: 2017-11-23

## 2017-11-21 RX ORDER — OXYCODONE HYDROCHLORIDE 5 MG/1
5 TABLET ORAL EVERY 4 HOURS
Qty: 0 | Refills: 0 | Status: DISCONTINUED | OUTPATIENT
Start: 2017-11-21 | End: 2017-11-23

## 2017-11-21 RX ORDER — SODIUM CHLORIDE 9 MG/ML
1000 INJECTION, SOLUTION INTRAVENOUS
Qty: 0 | Refills: 0 | Status: DISCONTINUED | OUTPATIENT
Start: 2017-11-21 | End: 2017-11-21

## 2017-11-21 RX ORDER — SODIUM CHLORIDE 9 MG/ML
1000 INJECTION, SOLUTION INTRAVENOUS
Qty: 0 | Refills: 0 | Status: DISCONTINUED | OUTPATIENT
Start: 2017-11-21 | End: 2017-11-22

## 2017-11-21 RX ORDER — DEXTROSE 50 % IN WATER 50 %
12.5 SYRINGE (ML) INTRAVENOUS ONCE
Qty: 0 | Refills: 0 | Status: DISCONTINUED | OUTPATIENT
Start: 2017-11-21 | End: 2017-11-23

## 2017-11-21 RX ORDER — DEXTROSE 50 % IN WATER 50 %
1 SYRINGE (ML) INTRAVENOUS ONCE
Qty: 0 | Refills: 0 | Status: DISCONTINUED | OUTPATIENT
Start: 2017-11-21 | End: 2017-11-23

## 2017-11-21 RX ORDER — SENNA PLUS 8.6 MG/1
2 TABLET ORAL AT BEDTIME
Qty: 0 | Refills: 0 | Status: DISCONTINUED | OUTPATIENT
Start: 2017-11-21 | End: 2017-11-23

## 2017-11-21 RX ADMIN — HEPARIN SODIUM 5000 UNIT(S): 5000 INJECTION INTRAVENOUS; SUBCUTANEOUS at 21:05

## 2017-11-21 RX ADMIN — SODIUM CHLORIDE 125 MILLILITER(S): 9 INJECTION, SOLUTION INTRAVENOUS at 03:33

## 2017-11-21 RX ADMIN — LOSARTAN POTASSIUM 100 MILLIGRAM(S): 100 TABLET, FILM COATED ORAL at 06:21

## 2017-11-21 RX ADMIN — HEPARIN SODIUM 5000 UNIT(S): 5000 INJECTION INTRAVENOUS; SUBCUTANEOUS at 06:21

## 2017-11-21 RX ADMIN — Medication 100 MILLIGRAM(S): at 21:05

## 2017-11-21 RX ADMIN — CEFTRIAXONE 100 GRAM(S): 500 INJECTION, POWDER, FOR SOLUTION INTRAMUSCULAR; INTRAVENOUS at 21:05

## 2017-11-21 RX ADMIN — HEPARIN SODIUM 5000 UNIT(S): 5000 INJECTION INTRAVENOUS; SUBCUTANEOUS at 13:02

## 2017-11-21 RX ADMIN — Medication 650 MILLIGRAM(S): at 18:02

## 2017-11-21 RX ADMIN — Medication 100 MILLIGRAM(S): at 13:02

## 2017-11-21 RX ADMIN — SENNA PLUS 2 TABLET(S): 8.6 TABLET ORAL at 21:05

## 2017-11-21 RX ADMIN — TAMSULOSIN HYDROCHLORIDE 0.4 MILLIGRAM(S): 0.4 CAPSULE ORAL at 21:05

## 2017-11-21 RX ADMIN — Medication 25 MILLIGRAM(S): at 06:21

## 2017-11-21 RX ADMIN — Medication 100 MILLIGRAM(S): at 06:21

## 2017-11-21 RX ADMIN — SODIUM CHLORIDE 125 MILLILITER(S): 9 INJECTION, SOLUTION INTRAVENOUS at 06:25

## 2017-11-21 RX ADMIN — SODIUM CHLORIDE 75 MILLILITER(S): 9 INJECTION, SOLUTION INTRAVENOUS at 18:04

## 2017-11-21 NOTE — CONSULT NOTE ADULT - ASSESSMENT
61M h/o HTN, DM, renal stone s/p right stent placement on 11/2/17 presents with fever likely 2/2 sepsis 2/2 to UTI c/b ROXY.

## 2017-11-21 NOTE — CONSULT NOTE ADULT - PROBLEM SELECTOR RECOMMENDATION 3
MjZ2c=3.7%. FS well controlled. Continue with ISS for now and continue to monitor FS closely.  Continue to hold metformin while in-house

## 2017-11-21 NOTE — H&P ADULT - HISTORY OF PRESENT ILLNESS
This is a 61 y.o male  who is s/p right stent placement om 11/2 who came to the ER complaining of fever for 2 days, with temperature of 101.8 last night at 7p. He denies any chest pain, dyspnea, flank pain, dysuria, hematuria.

## 2017-11-21 NOTE — CONSULT NOTE ADULT - SUBJECTIVE AND OBJECTIVE BOX
CHIEF COMPLAINT: Patient is a 61y old  Male who presents with a chief complaint of Fever, Malaise (2017 05:08)    HPI: 61M h/o HTN, DM, renal stone s/p right stent placement on 17 presents to the ED complaining of fever for 2 days, with temperature of 101.8 last night at 7pm. Patient denies any chest pain, dyspnea, flank pain, dysuria, hematuria. Patient spiked a low-grade temp of 100.0 today at 1pm. Patient seen and examined. Pain well controlled and patient without any complaints.     Allergies: No Known Allergies    HOME MEDICATIONS: [x] Reviewed    MEDICATIONS  (STANDING):  cefTRIAXone   IVPB 1 Gram(s) IV Intermittent every 24 hours  dextrose 5%. 1000 milliLiter(s) (50 mL/Hr) IV Continuous <Continuous>  dextrose 50% Injectable 12.5 Gram(s) IV Push once  dextrose 50% Injectable 25 Gram(s) IV Push once  dextrose 50% Injectable 25 Gram(s) IV Push once  docusate sodium 100 milliGRAM(s) Oral three times a day  heparin  Injectable 5000 Unit(s) SubCutaneous every 8 hours  insulin lispro (HumaLOG) corrective regimen sliding scale   SubCutaneous three times a day before meals  insulin lispro (HumaLOG) corrective regimen sliding scale   SubCutaneous at bedtime  lactated ringers. 1000 milliLiter(s) (125 mL/Hr) IV Continuous <Continuous>  metoprolol succinate ER 25 milliGRAM(s) Oral daily  senna 2 Tablet(s) Oral at bedtime  tamsulosin 0.4 milliGRAM(s) Oral at bedtime    MEDICATIONS  (PRN):  dextrose Gel 1 Dose(s) Oral once PRN Blood Glucose LESS THAN 70 milliGRAM(s)/deciliter  glucagon  Injectable 1 milliGRAM(s) IntraMuscular once PRN Glucose LESS THAN 70 milligrams/deciliter  morphine  - Injectable 2 milliGRAM(s) IV Push every 4 hours PRN Severe Pain (7 - 10)  oxyCODONE    IR 10 milliGRAM(s) Oral every 6 hours PRN Moderate Pain (4 - 6)  oxyCODONE    IR 5 milliGRAM(s) Oral every 4 hours PRN Mild Pain (1 - 3)    PAST MEDICAL & SURGICAL HISTORY:  Renal stone  DM (diabetes mellitus)  HTN (hypertension)  History of renal stent: right  History of cholecystectomy    SOCIAL HISTORY:  [x] Substance abuse: none  [x] Tobacco: never  [x] Alcohol use: never    FAMILY HISTORY:  No pertinent family history in first degree relatives    REVIEW OF SYSTEMS:  [x] All other ROS negative      Vital Signs Last 24 Hrs  T(C): 37.8 (2017 13:01), Max: 37.9 (2017 21:31)  T(F): 100 (2017 13:01), Max: 100.2 (2017 21:31)  HR: 99 (2017 13:) (86 - 112)  BP: 135/77 (2017 13:) (95/61 - 135/77)  BP(mean): --  RR: 18 (2017 13:) (16 - 20)  SpO2: 97% (2017 13:) (97% - 100%)    PHYSICAL EXAM:  GENERAL: NAD, well-groomed, well-developed  HEAD:  Atraumatic, Normocephalic  EYES: EOMI, PERRLA, conjunctiva and sclera clear  ENMT: Moist mucous membranes  NECK: Supple, No JVD  RESPIRATORY: Clear to auscultation bilaterally; No rales, rhonchi, wheezing, or rubs  CARDIOVASCULAR: Regular rate and rhythm; No murmurs, rubs, or gallops  GASTROINTESTINAL: Soft, Nontender, Nondistended; Bowel sounds present  EXTREMITIES:  2+ Peripheral Pulses, No clubbing, cyanosis, or edema  NERVOUS SYSTEM:  Alert & Oriented X3; Moving all 4 extremities; No gross sensory deficits  SKIN: No rashes or lesions    LABS:                        11.5   10.56 )-----------( 107      ( 2017 07:20 )             34.7     11-    139  |  101  |  25<H>  ----------------------------<  116<H>  4.1   |  23  |  1.60<H>    Ca    8.8      2017 06:00    TPro  7.2  /  Alb  3.6  /  TBili  1.0  /  DBili  x   /  AST  16  /  ALT  16  /  AlkPhos  37<L>  11-20    Urinalysis Basic - ( 2017 21:30 )  Color: YELLOW / Appearance: TURBID / S.017 / pH: 6.0  Gluc: 250 / Ketone: NEGATIVE  / Bili: NEGATIVE / Urobili: 1 E.U.   Blood: MODERATE / Protein: 100 / Nitrite: NEGATIVE   Leuk Esterase: LARGE / RBC: 5-10 / WBC >50   Sq Epi: x / Non Sq Epi: x / Bacteria: MANY    CAPILLARY BLOOD GLUCOSE  POCT Blood Glucose.: 98 mg/dL (2017 12:00)    RADIOLOGY & ADDITIONAL STUDIES:    Imaging:   Personally Reviewed:  [x] YES   < from: CT Renal Stone Hunt (17 @ 02:39) >  IMPRESSION:     Right-sided ureteral stent in appropriate position. No hydronephrosis.   Mild right-sided perinephric fat stranding, infection may be possible in   the appropriate clinical setting.    Small calculus within the bladder.    < end of copied text >              [ ] Consultant(s) Notes Reviewed  [x] Care Discussed with Consultants/Other Providers: Urology PA - discussed

## 2017-11-21 NOTE — CONSULT NOTE ADULT - PROBLEM SELECTOR RECOMMENDATION 4
-likely  secondary to dehydration in the setting of sepsis  -c/w IVF hydration with LR @125cc/hr and monitor creatinine closely  -avoid nephrotoxins  -will d/c losartan

## 2017-11-21 NOTE — ED ADULT NURSE REASSESSMENT NOTE - NS ED NURSE REASSESS COMMENT FT1
Pt. received in room # 10, A&O x3, CM to NSR. awaiting CT scan results. no acute distress noted. denies pain at this time.

## 2017-11-22 LAB
BUN SERPL-MCNC: 23 MG/DL — SIGNIFICANT CHANGE UP (ref 7–23)
CALCIUM SERPL-MCNC: 8.9 MG/DL — SIGNIFICANT CHANGE UP (ref 8.4–10.5)
CHLORIDE SERPL-SCNC: 99 MMOL/L — SIGNIFICANT CHANGE UP (ref 98–107)
CO2 SERPL-SCNC: 23 MMOL/L — SIGNIFICANT CHANGE UP (ref 22–31)
CREAT SERPL-MCNC: 1.31 MG/DL — HIGH (ref 0.5–1.3)
GLUCOSE BLDC GLUCOMTR-MCNC: 124 MG/DL — HIGH (ref 70–99)
GLUCOSE BLDC GLUCOMTR-MCNC: 134 MG/DL — HIGH (ref 70–99)
GLUCOSE BLDC GLUCOMTR-MCNC: 171 MG/DL — HIGH (ref 70–99)
GLUCOSE BLDC GLUCOMTR-MCNC: 179 MG/DL — HIGH (ref 70–99)
GLUCOSE SERPL-MCNC: 138 MG/DL — HIGH (ref 70–99)
HCT VFR BLD CALC: 33.2 % — LOW (ref 39–50)
HGB BLD-MCNC: 11.5 G/DL — LOW (ref 13–17)
MCHC RBC-ENTMCNC: 30.3 PG — SIGNIFICANT CHANGE UP (ref 27–34)
MCHC RBC-ENTMCNC: 34.6 % — SIGNIFICANT CHANGE UP (ref 32–36)
MCV RBC AUTO: 87.6 FL — SIGNIFICANT CHANGE UP (ref 80–100)
NRBC # FLD: 0 — SIGNIFICANT CHANGE UP
ORGANISM # SPEC MICROSCOPIC CNT: SIGNIFICANT CHANGE UP
ORGANISM # SPEC MICROSCOPIC CNT: SIGNIFICANT CHANGE UP
PLATELET # BLD AUTO: 123 K/UL — LOW (ref 150–400)
PMV BLD: 10.3 FL — SIGNIFICANT CHANGE UP (ref 7–13)
POTASSIUM SERPL-MCNC: 3.3 MMOL/L — LOW (ref 3.5–5.3)
POTASSIUM SERPL-SCNC: 3.3 MMOL/L — LOW (ref 3.5–5.3)
RBC # BLD: 3.79 M/UL — LOW (ref 4.2–5.8)
RBC # FLD: 13.5 % — SIGNIFICANT CHANGE UP (ref 10.3–14.5)
SODIUM SERPL-SCNC: 137 MMOL/L — SIGNIFICANT CHANGE UP (ref 135–145)
SPECIMEN SOURCE: SIGNIFICANT CHANGE UP
WBC # BLD: 7.74 K/UL — SIGNIFICANT CHANGE UP (ref 3.8–10.5)
WBC # FLD AUTO: 7.74 K/UL — SIGNIFICANT CHANGE UP (ref 3.8–10.5)

## 2017-11-22 PROCEDURE — 99233 SBSQ HOSP IP/OBS HIGH 50: CPT

## 2017-11-22 PROCEDURE — 99231 SBSQ HOSP IP/OBS SF/LOW 25: CPT

## 2017-11-22 RX ORDER — LORATADINE 10 MG/1
10 TABLET ORAL DAILY
Qty: 0 | Refills: 0 | Status: DISCONTINUED | OUTPATIENT
Start: 2017-11-22 | End: 2017-11-23

## 2017-11-22 RX ORDER — POTASSIUM CHLORIDE 20 MEQ
40 PACKET (EA) ORAL ONCE
Qty: 0 | Refills: 0 | Status: COMPLETED | OUTPATIENT
Start: 2017-11-22 | End: 2017-11-22

## 2017-11-22 RX ADMIN — SENNA PLUS 2 TABLET(S): 8.6 TABLET ORAL at 22:25

## 2017-11-22 RX ADMIN — Medication 100 MILLIGRAM(S): at 22:25

## 2017-11-22 RX ADMIN — HEPARIN SODIUM 5000 UNIT(S): 5000 INJECTION INTRAVENOUS; SUBCUTANEOUS at 05:38

## 2017-11-22 RX ADMIN — Medication 1: at 17:26

## 2017-11-22 RX ADMIN — LORATADINE 10 MILLIGRAM(S): 10 TABLET ORAL at 17:25

## 2017-11-22 RX ADMIN — HEPARIN SODIUM 5000 UNIT(S): 5000 INJECTION INTRAVENOUS; SUBCUTANEOUS at 14:36

## 2017-11-22 RX ADMIN — Medication 100 MILLIGRAM(S): at 05:38

## 2017-11-22 RX ADMIN — Medication 40 MILLIEQUIVALENT(S): at 11:31

## 2017-11-22 RX ADMIN — Medication 100 MILLIGRAM(S): at 14:36

## 2017-11-22 RX ADMIN — HEPARIN SODIUM 5000 UNIT(S): 5000 INJECTION INTRAVENOUS; SUBCUTANEOUS at 22:19

## 2017-11-22 RX ADMIN — CEFTRIAXONE 100 GRAM(S): 500 INJECTION, POWDER, FOR SOLUTION INTRAMUSCULAR; INTRAVENOUS at 22:19

## 2017-11-22 RX ADMIN — TAMSULOSIN HYDROCHLORIDE 0.4 MILLIGRAM(S): 0.4 CAPSULE ORAL at 22:19

## 2017-11-22 NOTE — PROGRESS NOTE ADULT - PROBLEM SELECTOR PLAN 4
-likely  secondary to dehydration in the setting of sepsis  -off IVF hydration   -creatinine improving  -avoid nephrotoxins  -will d/c losartan

## 2017-11-22 NOTE — PROGRESS NOTE ADULT - PROBLEM SELECTOR PLAN 1
cont ceftriaxone  AM labs reviewed  Hospitalist co-management  F/U cultures  Monitor VS  OOB
cont ceftriaxone  check cultures  OOB  monitor for fever
-likely secondary to UTI  -UCx growing >100,000 GNR  -BCx growing Ecoli bacteremia  -c/w ceftriaxone IV  -monitor fevers  -f/u repeat BCx from 11/21

## 2017-11-22 NOTE — PROGRESS NOTE ADULT - PROBLEM SELECTOR PLAN 3
QhW1w=3.7%. FS well controlled. Continue with ISS for now and continue to monitor FS closely. Continue to hold metformin while in-house.

## 2017-11-23 ENCOUNTER — TRANSCRIPTION ENCOUNTER (OUTPATIENT)
Age: 62
End: 2017-11-23

## 2017-11-23 VITALS
DIASTOLIC BLOOD PRESSURE: 55 MMHG | HEART RATE: 78 BPM | OXYGEN SATURATION: 99 % | TEMPERATURE: 98 F | SYSTOLIC BLOOD PRESSURE: 112 MMHG | RESPIRATION RATE: 17 BRPM

## 2017-11-23 LAB
-  AMIKACIN: SIGNIFICANT CHANGE UP
-  AMPICILLIN/SULBACTAM: SIGNIFICANT CHANGE UP
-  AMPICILLIN: SIGNIFICANT CHANGE UP
-  AZTREONAM: SIGNIFICANT CHANGE UP
-  CEFAZOLIN: SIGNIFICANT CHANGE UP
-  CEFEPIME: SIGNIFICANT CHANGE UP
-  CEFOXITIN: SIGNIFICANT CHANGE UP
-  CEFTAZIDIME: SIGNIFICANT CHANGE UP
-  CEFTRIAXONE: SIGNIFICANT CHANGE UP
-  CIPROFLOXACIN: SIGNIFICANT CHANGE UP
-  ERTAPENEM: SIGNIFICANT CHANGE UP
-  GENTAMICIN: SIGNIFICANT CHANGE UP
-  IMIPENEM: SIGNIFICANT CHANGE UP
-  LEVOFLOXACIN: SIGNIFICANT CHANGE UP
-  MEROPENEM: SIGNIFICANT CHANGE UP
-  NITROFURANTOIN: SIGNIFICANT CHANGE UP
-  PIPERACILLIN/TAZOBACTAM: SIGNIFICANT CHANGE UP
-  TIGECYCLINE: SIGNIFICANT CHANGE UP
-  TOBRAMYCIN: SIGNIFICANT CHANGE UP
-  TRIMETHOPRIM/SULFAMETHOXAZOLE: SIGNIFICANT CHANGE UP
BACTERIA UR CULT: SIGNIFICANT CHANGE UP
GLUCOSE BLDC GLUCOMTR-MCNC: 171 MG/DL — HIGH (ref 70–99)
GLUCOSE BLDC GLUCOMTR-MCNC: 217 MG/DL — HIGH (ref 70–99)
METHOD TYPE: SIGNIFICANT CHANGE UP
ORGANISM # SPEC MICROSCOPIC CNT: SIGNIFICANT CHANGE UP
ORGANISM # SPEC MICROSCOPIC CNT: SIGNIFICANT CHANGE UP

## 2017-11-23 PROCEDURE — 99231 SBSQ HOSP IP/OBS SF/LOW 25: CPT

## 2017-11-23 RX ORDER — AZTREONAM 2 G
1 VIAL (EA) INJECTION
Qty: 24 | Refills: 0
Start: 2017-11-23 | End: 2017-12-05

## 2017-11-23 RX ORDER — POLYETHYLENE GLYCOL 3350 17 G/17G
17 POWDER, FOR SOLUTION ORAL THREE TIMES A DAY
Qty: 0 | Refills: 0 | Status: DISCONTINUED | OUTPATIENT
Start: 2017-11-23 | End: 2017-11-23

## 2017-11-23 RX ADMIN — Medication 25 MILLIGRAM(S): at 07:12

## 2017-11-23 RX ADMIN — Medication 1: at 08:40

## 2017-11-23 RX ADMIN — HEPARIN SODIUM 5000 UNIT(S): 5000 INJECTION INTRAVENOUS; SUBCUTANEOUS at 07:12

## 2017-11-23 RX ADMIN — Medication 2: at 13:07

## 2017-11-23 NOTE — DISCHARGE NOTE ADULT - PATIENT PORTAL LINK FT
“You can access the Follow[employer] Patient Portal, offered by [facility], by registering with the following website: [email or web address]”

## 2017-11-23 NOTE — PROGRESS NOTE ADULT - ATTENDING COMMENTS
patient seen and examined, above noted, awaiting culture results.
Patient seen and above noted. CT reviewed. Will continue antibiotics pending sensitivities.
patient seen and examined, above noted. Will await culture sensitivities.

## 2017-11-23 NOTE — PROGRESS NOTE ADULT - SUBJECTIVE AND OBJECTIVE BOX
Overnight events:  Last fever 102.3 at 5pm, stable BP, has remained afebrile since    Subjective:  Pt offers no complaints    Objective:    Vital signs  T(C): , Max: 39.1 (11-21-17 @ 17:24)  HR: 87 (11-22-17 @ 05:37)  BP: 110/67 (11-22-17 @ 05:37)  SpO2: 96% (11-22-17 @ 05:37)  Wt(kg): --    Output   Void: 900        Gen: NAD  Abd: soft, nontender, no CVAT      Labs                        11.5   7.74  )-----------( 123      ( 22 Nov 2017 06:00 )             33.2     22 Nov 2017 06:00    137    |  99     |  23     ----------------------------<  138    3.3     |  23     |  1.31     Ca    8.9        22 Nov 2017 06:00        Urine Cx: pending    Blood Cx:   Culture - Blood (11.20.17 @ 22:19)    Culture - Blood:   NO ORGANISMS ISOLATED  NO ORGANISMS ISOLATED AT 24 HOURS    Specimen Source: BLOOD VENOUS    Culture - Blood (11.20.17 @ 22:19)    Culture - Blood:   ***Blood Panel PCR results on this specimen are available  approximately 3 hours after the Gram stain result***  Gram stain, PCR, and/or culture results may not always  correspond due to difference in methodologies  ------------------------------------------------------------  This PCR assay was performed using [employer].  The  following targets are tested for:  Enterococcus, vancomycin  resistant enterococci, Listeria monocytogenes,  coagulase  negative staphylococci, S. aureus, methicillin resistant S.  aureus, Streptococcus agalactiae (Group B), S. pneumoniae,  S. pyogenes (Group A), Acinetobacter baumannii, Enterobacter  cloacae, E. coli, Klebsiella oxytoca, K. pneumoniae, Proteus  sp., Serratia marcescens, Haemophilus influenzae, Neisseria  meningitidis, Pseudomonas aeruginosa, Candida albicans, C.  glabrata, C. krusei, C. parapsilosis, C. tropicalis and the  KPC resistance gene.    -  Escherichia coli: + DETECT IMAN    Specimen Source: BLOOD PERIPHERAL    Organism: BLOOD CULTURE PCR    Gram Stain Blood:   ***** CRITICAL RESULT *****  PERSON CALLED / READ-BACK: JILL PENA/WANDER/TEJA  DATE / TIME CALLED: 11/21/17 133  CALLED BY: VIRY BROWN^Gram Neg Rods  AFTER: 13 HOURS INCUBATION  BOTTLE: AEROBIC BOTTLE    Organism Identification: BLOOD CULTURE PCR    Method Type: PCR
Subjective    Patient seen and examined. No overnight events. No fevers. Blood cx NGTD. Ambulating, voiding, tolerating diet.    Objective    Vital signs  T(F): , Max: 99.7 (11-22-17 @ 17:20)  HR: 84 (11-23-17 @ 07:10)  BP: 117/74 (11-23-17 @ 07:10)  SpO2: 98% (11-23-17 @ 07:10)  Wt(kg): --    Output     11-22 @ 07:01  -  11-23 @ 07:00  --------------------------------------------------------  IN: 0 mL / OUT: 1525 mL / NET: -1525 mL        General: NAD  Abdomen: soft/non-tender/non-distended    Labs      11-22 @ 06:00    WBC 7.74  / Hct 33.2  / SCr 1.31
T m 100.1  Afeb 124/66 93 99%RA    Pt has no c/o    Abd- soft NT ND     Void 400; 
CHIEF COMPLAINT: f/u bacteremia    SUBJECTIVE / OVERNIGHT EVENTS: Patient seen and examined. Last fever 102.3 at 5pm, stable BP, has remained afebrile since. No acute events overnight. Pain well controlled and patient without any complaints.    MEDICATIONS  (STANDING):  cefTRIAXone   IVPB 1 Gram(s) IV Intermittent every 24 hours  dextrose 5%. 1000 milliLiter(s) (50 mL/Hr) IV Continuous <Continuous>  dextrose 50% Injectable 12.5 Gram(s) IV Push once  dextrose 50% Injectable 25 Gram(s) IV Push once  dextrose 50% Injectable 25 Gram(s) IV Push once  docusate sodium 100 milliGRAM(s) Oral three times a day  heparin  Injectable 5000 Unit(s) SubCutaneous every 8 hours  insulin lispro (HumaLOG) corrective regimen sliding scale   SubCutaneous three times a day before meals  insulin lispro (HumaLOG) corrective regimen sliding scale   SubCutaneous at bedtime  metoprolol succinate ER 25 milliGRAM(s) Oral daily  senna 2 Tablet(s) Oral at bedtime  tamsulosin 0.4 milliGRAM(s) Oral at bedtime    MEDICATIONS  (PRN):  acetaminophen   Tablet 650 milliGRAM(s) Oral every 6 hours PRN For Temp greater than 38.5 C (101.3 F)  dextrose Gel 1 Dose(s) Oral once PRN Blood Glucose LESS THAN 70 milliGRAM(s)/deciliter  glucagon  Injectable 1 milliGRAM(s) IntraMuscular once PRN Glucose LESS THAN 70 milligrams/deciliter  morphine  - Injectable 2 milliGRAM(s) IV Push every 4 hours PRN Severe Pain (7 - 10)  oxyCODONE    IR 10 milliGRAM(s) Oral every 6 hours PRN Moderate Pain (4 - 6)  oxyCODONE    IR 5 milliGRAM(s) Oral every 4 hours PRN Mild Pain (1 - 3)    VITALS:  T(F): 98.8 (17 @ 09:48), Max: 102.3 (17 @ 17:24)  HR: 88 (17 @ 09:48) (85 - 99)  BP: 110/55 (17 @ 09:48) (100/60 - 135/77)  RR: 17 (17 @ 09:48) (17 - 18)  SpO2: 96% (17 @ 09:48)    PHYSICAL EXAM:  GENERAL: NAD, well-groomed, well-developed  RESPIRATORY: Clear to auscultation bilaterally; No rales, rhonchi, wheezing, or rubs  CARDIOVASCULAR: Regular rate and rhythm; No murmurs, rubs, or gallops  GASTROINTESTINAL: Soft, Nontender, Nondistended; Bowel sounds present  EXTREMITIES:  2+ Peripheral Pulses, No clubbing, cyanosis, or edema    LABS:              11.5                 137  | 23   | 23           7.74  >-----------< 123     ------------------------< 138                   33.2                 3.3  | 99   | 1.31                                         Ca 8.9   Mg x     Ph x           TPro  7.2  /  Alb  3.6      TBili  1.0  /  DBili  x         AST  16  /  ALT  16            AlkPhos  37      Urinalysis Basic - ( 2017 21:30 )    Color: YELLOW / Appearance: TURBID / S.017 / pH: 6.0  Gluc: 250 / Ketone: NEGATIVE  / Bili: NEGATIVE / Urobili: 1 E.U.   Blood: MODERATE / Protein: 100 / Nitrite: NEGATIVE   Leuk Esterase: LARGE / RBC: 5-10 / WBC >50   Sq Epi: x / Non Sq Epi: x / Bacteria: MANY    VB-20 @ 21:30  7.45/41/32/27/60.0/3.7    MICROBIOLOGY:  Blood culture  ***Blood Panel PCR results on this specimen are available  approximately 3 hours after the Gram stain result***  Gram stain, PCR, and/or culture results may not always  correspond due to difference in methodologies  ------------------------------------------------------------  This PCR assay was performed using [employer].  The  following targets are tested for:  Enterococcus, vancomycin  resistant enterococci, Listeria monocytogenes,  coagulase  negative staphylococci, S. aureus, methicillin resistant S.  aureus, Streptococcus agalactiae (Group B), S. pneumoniae,  S. pyogenes (Group A), Acinetobacter baumannii, Enterobacter  cloacae, E. coli, Klebsiella oxytoca, K. pneumoniae, Proteus  sp., Serratia marcescens, Haemophilus influenzae, Neisseria  meningitidis, Pseudomonas aeruginosa, Candida albicans, C.  glabrata, C. krusei, C. parapsilosis, C. tropicalis and the  KPC resistance gene. @ 22:19    Urine Culture  GNRID^Gram Neg Josue To Be Identified  COLONY COUNT: > = 100,000 CFU/ML @ 22:20    [ ] Consultant(s) Notes Reviewed:  [x] Care Discussed with Consultants/Other Providers: Urology PA - discussed management of bacteremia

## 2017-11-23 NOTE — PROVIDER CONTACT NOTE (CRITICAL VALUE NOTIFICATION) - ACTION/TREATMENT ORDERED:
YASMIN Vigil made aware. No further action required at this time.
MD will check cultures and make assessment

## 2017-11-23 NOTE — DIETITIAN INITIAL EVALUATION ADULT. - OTHER INFO
Nutrition Consult X HbA1c 8.7%. Pt 60 yo male appears alert, oriented; Pt's spouse @ bed side. Per Pt his appetite usually fine; No chew/swallow problem voiced; no nausea/vomiting/diarrhea reported @ present. Per Pt his UBW: ~165#. Pt feels he probably lost weight. Per bed scale Pt's body weight: 161# (11/23). Of note Pt's HbA1c level 8.7%. Consistent Carbohydrate diet discussed with Pt including better food choices, foods to avoid; RDN answered questions/concerns related to diet. Written materials on diet also provided; Pt verbalized understanding. RDN remains available, Pt & his spouse made aware.

## 2017-11-23 NOTE — PROGRESS NOTE ADULT - ASSESSMENT
60 yo M s/p right ureteral stent placement for calculus on 11/2 admitted with fever, Bcx PCR Ecoli, last fever 102.3 at 5pm 11/21
62 yo M admitted with fever at home s/p R. stent 11/2
62 yo M s/p right ureteral stent placement for calculus on 11/2 admitted with fever, Bcx PCR Ecoli, last fever 102.3 at 5pm 11/21    --continue ceftriaxone, f/up culture sens  -- Out of bed, pain control, incentive spirometry, DVT prophylaxis   -- dispo planning for home once sens resulted, total 14 days
61M h/o HTN, DM, renal stone s/p right stent placement on 11/2/17 presents with fever likely 2/2 sepsis 2/2 to UTI c/b ROXY - improving.

## 2017-11-23 NOTE — DISCHARGE NOTE ADULT - HOSPITAL COURSE
Patient admitted to the urology service. Initial CT showed that the stent was in place and a stone visualized in bladder. Pt was started on ceftriaxone and was initially febrile. Urine and blood cultures grew E. coli, pansensitive. Repeat blood cx were negative.    At the time of discharge, the patient was hemodynamically stable, was tolerating PO diet, was voiding urine and was ambulating, and was comfortable with adequate pain control. Pt was afebrile for over 24 hours. The patient was instructed to follow up with Dr. Moreno within 1-2 weeks after discharge from the hospital. The patient felt comfortable with discharge. The patient was discharged to home. The patient had no other issues. Patient admitted to the urology service. Initial CT showed that the stent was in place and a stone visualized in bladder. Pt was started on ceftriaxone and was initially febrile. Urine and blood cultures grew E. coli, pansensitive. Repeat blood cx were negative.    At the time of discharge, the patient was hemodynamically stable, was tolerating PO diet, was voiding urine and was ambulating, and was comfortable with adequate pain control. Pt was afebrile for over 24 hours. The patient was instructed to follow up with Dr. Moreno within 1-2 weeks after discharge from the hospital. The patient felt comfortable with discharge. The patient was discharged to home. The patient had no other issues.     Please finish your 12 day course of Bactrim.

## 2017-11-23 NOTE — DISCHARGE NOTE ADULT - CONDITIONS AT DISCHARGE
afebrile, Vital signs stable, denies any discomfort, tolerating  diet well, denies any nausea and vomiting, voiding adequate urine.

## 2017-11-23 NOTE — DIETITIAN INITIAL EVALUATION ADULT. - NS AS NUTRI INTERV MEALS SNACK
Diets modified for specific foods and ingredients/1. Suggest: PO diet rx: Regular, Consistent Carbohydrate (no snacks), DASH/TLC (cholesterol and sodium restricted);            2. Monitor PO diet tolerance;            3. Monitor labs, weights, hydration status;            4. Suggest outpatient follow up with an endocrinologist to ensure long-term DM diet comprehension and compliance. Suggest outpatient follow up with appropriate RD for the purposes of long-term nutrition evaluation and diet education;/Other (specify)

## 2017-11-23 NOTE — DIETITIAN INITIAL EVALUATION ADULT. - PERTINENT LABORATORY DATA
(11/23) POCT glucose levels: 217H, 171H, 179H, 171H;         (11/22) H/H 11.5/33.2 L,  L, K 3.3 L, Creat 1.31 H, Glu 138 H;             (11/20) Albumin 3.6;           (11/3) HbA1c 8.7% H

## 2017-11-23 NOTE — DISCHARGE NOTE ADULT - CARE PLAN
Principal Discharge DX:	UTI (urinary tract infection)  Goal:	Resolution of condition and symptoms  Instructions for follow-up, activity and diet:	Please follow up with Dr. Moreno within 1-2 weeks after discharge from the hospital. You may call [phone] to schedule an appointment.

## 2017-11-23 NOTE — DISCHARGE NOTE ADULT - PLAN OF CARE
Resolution of condition and symptoms Please follow up with Dr. Moreno within 1-2 weeks after discharge from the hospital. You may call [phone] to schedule an appointment.

## 2017-11-23 NOTE — DISCHARGE NOTE ADULT - MEDICATION SUMMARY - MEDICATIONS TO TAKE
I will START or STAY ON the medications listed below when I get home from the hospital:    tamsulosin 0.4 mg oral capsule  -- 1 cap(s) by mouth once a day (at bedtime)  -- Indication: For Home med    metFORMIN 1000 mg oral tablet  -- 1 tab(s) by mouth 2 times a day  -- Indication: For Home med    senna oral tablet  -- 2 tab(s) by mouth once a day (at bedtime)  -- Indication: For Home med    docusate sodium 100 mg oral capsule  -- 1 cap(s) by mouth 3 times a day  -- Indication: For Home med    Bactrim  mg-160 mg oral tablet  -- 1 tab(s) by mouth 2 times a day   -- Avoid prolonged or excessive exposure to direct and/or artificial sunlight while taking this medication.  Finish all this medication unless otherwise directed by prescriber.  Medication should be taken with plenty of water.    -- Indication: For UTI (urinary tract infection)

## 2017-11-24 LAB
-  AMIKACIN: SIGNIFICANT CHANGE UP
-  AMPICILLIN/SULBACTAM: SIGNIFICANT CHANGE UP
-  AMPICILLIN: SIGNIFICANT CHANGE UP
-  AZTREONAM: SIGNIFICANT CHANGE UP
-  CEFAZOLIN: SIGNIFICANT CHANGE UP
-  CEFEPIME: SIGNIFICANT CHANGE UP
-  CEFOXITIN: SIGNIFICANT CHANGE UP
-  CEFTAZIDIME: SIGNIFICANT CHANGE UP
-  CEFTRIAXONE: SIGNIFICANT CHANGE UP
-  CIPROFLOXACIN: SIGNIFICANT CHANGE UP
-  ERTAPENEM: SIGNIFICANT CHANGE UP
-  GENTAMICIN: SIGNIFICANT CHANGE UP
-  IMIPENEM: SIGNIFICANT CHANGE UP
-  LEVOFLOXACIN: SIGNIFICANT CHANGE UP
-  MEROPENEM: SIGNIFICANT CHANGE UP
-  PIPERACILLIN/TAZOBACTAM: SIGNIFICANT CHANGE UP
-  TIGECYCLINE: SIGNIFICANT CHANGE UP
-  TOBRAMYCIN: SIGNIFICANT CHANGE UP
-  TRIMETHOPRIM/SULFAMETHOXAZOLE: SIGNIFICANT CHANGE UP
BACTERIA BLD CULT: SIGNIFICANT CHANGE UP
E COLI DNA BLD POS QL NAA+NON-PROBE: SIGNIFICANT CHANGE UP
METHOD TYPE: SIGNIFICANT CHANGE UP

## 2017-11-25 LAB — BACTERIA BLD CULT: SIGNIFICANT CHANGE UP

## 2017-11-26 LAB
BACTERIA BLD CULT: SIGNIFICANT CHANGE UP
BACTERIA BLD CULT: SIGNIFICANT CHANGE UP
ORGANISM # SPEC MICROSCOPIC CNT: SIGNIFICANT CHANGE UP

## 2017-11-28 ENCOUNTER — APPOINTMENT (OUTPATIENT)
Dept: UROLOGY | Facility: HOSPITAL | Age: 62
End: 2017-11-28

## 2017-11-28 DIAGNOSIS — M70.22 OLECRANON BURSITIS, LEFT ELBOW: ICD-10-CM

## 2017-11-28 DIAGNOSIS — G56.22 LESION OF ULNAR NERVE, LEFT UPPER LIMB: ICD-10-CM

## 2017-11-28 DIAGNOSIS — Z00.00 ENCOUNTER FOR GENERAL ADULT MEDICAL EXAMINATION W/OUT ABNORMAL FINDINGS: ICD-10-CM

## 2017-12-04 ENCOUNTER — APPOINTMENT (OUTPATIENT)
Dept: UROLOGY | Facility: CLINIC | Age: 62
End: 2017-12-04
Payer: COMMERCIAL

## 2017-12-04 VITALS
OXYGEN SATURATION: 92 % | HEIGHT: 66 IN | BODY MASS INDEX: 24.91 KG/M2 | SYSTOLIC BLOOD PRESSURE: 120 MMHG | HEART RATE: 98 BPM | WEIGHT: 155 LBS | DIASTOLIC BLOOD PRESSURE: 80 MMHG

## 2017-12-04 PROCEDURE — 52310 CYSTOSCOPY AND TREATMENT: CPT

## 2017-12-06 LAB — BACTERIA UR CULT: NORMAL

## 2018-01-19 ENCOUNTER — APPOINTMENT (OUTPATIENT)
Dept: ORTHOPEDIC SURGERY | Facility: CLINIC | Age: 63
End: 2018-01-19
Payer: COMMERCIAL

## 2018-01-19 VITALS
BODY MASS INDEX: 25.34 KG/M2 | DIASTOLIC BLOOD PRESSURE: 91 MMHG | SYSTOLIC BLOOD PRESSURE: 156 MMHG | WEIGHT: 157 LBS | TEMPERATURE: 98.5 F | RESPIRATION RATE: 18 BRPM | OXYGEN SATURATION: 97 % | HEART RATE: 92 BPM

## 2018-01-19 PROCEDURE — 20605 DRAIN/INJ JOINT/BURSA W/O US: CPT | Mod: LT

## 2018-01-19 PROCEDURE — 73080 X-RAY EXAM OF ELBOW: CPT | Mod: LT

## 2018-01-19 PROCEDURE — 99204 OFFICE O/P NEW MOD 45 MIN: CPT | Mod: 25

## 2018-01-19 RX ORDER — ATORVASTATIN CALCIUM 10 MG/1
10 TABLET, FILM COATED ORAL
Qty: 30 | Refills: 0 | Status: ACTIVE | COMMUNITY
Start: 2017-12-31

## 2018-01-20 LAB
B PERT IGG+IGM PNL SER: NORMAL
COLOR FLD: NORMAL
EOSINOPHIL # FLD MANUAL: 1 %
FLUID INTAKE SUBSTANCE CLASS: NORMAL
LYMPHOCYTES # FLD MANUAL: 36 %
MONOS+MACROS NFR FLD MANUAL: 53 %
NEUTS SEG # FLD MANUAL: 10 %
RBC # FLD MANUAL: ABNORMAL /UL
TOTAL CELLS COUNTED FLD: 1616 /UL
TUBE TYPE: NORMAL

## 2018-01-30 RX ORDER — SULFAMETHOXAZOLE AND TRIMETHOPRIM 800; 160 MG/1; MG/1
800-160 TABLET ORAL TWICE DAILY
Qty: 14 | Refills: 0 | Status: ACTIVE | COMMUNITY
Start: 2018-01-30 | End: 1900-01-01

## 2018-02-08 LAB — BACTERIA FLD CULT: ABNORMAL

## 2018-02-09 ENCOUNTER — APPOINTMENT (OUTPATIENT)
Dept: ORTHOPEDIC SURGERY | Facility: CLINIC | Age: 63
End: 2018-02-09
Payer: COMMERCIAL

## 2018-02-09 VITALS
WEIGHT: 160 LBS | HEART RATE: 99 BPM | RESPIRATION RATE: 17 BRPM | TEMPERATURE: 98.6 F | SYSTOLIC BLOOD PRESSURE: 168 MMHG | DIASTOLIC BLOOD PRESSURE: 98 MMHG | BODY MASS INDEX: 25.82 KG/M2 | OXYGEN SATURATION: 96 %

## 2018-02-09 PROCEDURE — 99213 OFFICE O/P EST LOW 20 MIN: CPT

## 2018-03-19 ENCOUNTER — APPOINTMENT (OUTPATIENT)
Dept: UROLOGY | Facility: CLINIC | Age: 63
End: 2018-03-19

## 2019-03-22 ENCOUNTER — APPOINTMENT (OUTPATIENT)
Dept: UROLOGY | Facility: CLINIC | Age: 64
End: 2019-03-22
Payer: MEDICARE

## 2019-03-22 VITALS
BODY MASS INDEX: 26.98 KG/M2 | HEIGHT: 64.57 IN | DIASTOLIC BLOOD PRESSURE: 81 MMHG | SYSTOLIC BLOOD PRESSURE: 132 MMHG | WEIGHT: 160 LBS | OXYGEN SATURATION: 95 % | HEART RATE: 86 BPM | TEMPERATURE: 97.9 F | RESPIRATION RATE: 17 BRPM

## 2019-03-22 PROBLEM — Z00.00 ENCOUNTER FOR PREVENTIVE HEALTH EXAMINATION: Status: ACTIVE | Noted: 2017-11-06

## 2019-03-22 PROBLEM — M70.22 OLECRANON BURSITIS OF LEFT ELBOW: Status: ACTIVE | Noted: 2018-01-19

## 2019-03-22 PROBLEM — G56.22 ENTRAPMENT OF LEFT ULNAR NERVE: Status: ACTIVE | Noted: 2018-02-09

## 2019-03-22 PROCEDURE — 99214 OFFICE O/P EST MOD 30 MIN: CPT

## 2019-03-23 NOTE — LETTER BODY
[FreeTextEntry1] : Megan Alberto DO\par [address] [facility] [address]\[place] [address]\par [phone]\par \par Dear Dr. Alberto,\par \par Reason for Visit: Proteinuria. \par \par This is a 63 year-old Cantonese speaking gentleman with history of renal stones and diabetes, presents with proteinuria. Patient is referred for evaluation of his condition. Patient previously underwent ureteroscopy for renal stone. He is currently seeing nephrologist, Dr. David Caicedo. Patient reports of increase urinary frequency. Patient denies any gross hematuria or dysuria or urinary incontinence. The patient denies any aggravating or relieving factors. The patient denies any interference of function. The patient is entirely asymptomatic. All other review of systems are negative. He has no cancer in his family medical history. He previously had gallbladder surgery. Past medical history, family history and social history were inquired and were noncontributory to current condition. The patient does not use tobacco or drink alcohol. Medications and allergies were reviewed. He has no known allergies to medication. \par \par On examination, the patient is a healthy-appearing gentleman in no acute distress. He is alert and oriented follows commands. He has normal mood and affect. He is normocephalic. Oral no thrush. Neck is supple. Respirations are unlabored. His abdomen is soft and nontender. Liver is nonpalpable. Bladder is nonpalpable. No CVA tenderness. Neurologically he is grossly intact. No peripheral edema. Skin without gross abnormality. He has normal male external genitalia. Normal meatus. Bilateral testes are descended intrascrotally and normal to palpation. On rectal examination, there is normal sphincter tone. The prostate is clinically benign without focal induration or nodularity.\par \par His BMP demonstrated normal renal functions, creatinine 0.99. \par \par Assessment: Proteinuria. Right ureteral stone.\par \par I counseled the patient. I discussed the various etiologies of his symptoms. He will obtain PSA today. In terms of his proteinuria, the patient will obtain 24H protein urinalysis to further evaluate. In term of his right ureteral stone, the patient will obtain urinalysis and CT abdomen and pelvis.  I counseled the patient regarding the procedure. The risks and benefits were discussed. Alternatives were given. I answered the patient questions. The patient will take the necessary preparations for the procedure. Risks and alternatives were discussed. I answered the patient questions. The patient will follow-up as directed and will contact me with any questions or concerns. Thank you for the opportunity to participate in the care of this patient. I'll keep you updated on his progress.\par \par Plan: Urinalysis. PSA. 24H protein urinalysis. CT abdomen and pelvis w/o contrast. Follow up in 1 year.

## 2019-03-23 NOTE — ADDENDUM
[FreeTextEntry1] : Entered by Maddison Mims, acting as scribe for Dr. Gamal Caicedo.\par \par The documentation recorded by the scribe accurately reflects the service I personally performed and the decisions made by me.

## 2019-04-03 LAB
APPEARANCE: CLEAR
BACTERIA: NEGATIVE
BILIRUBIN URINE: NEGATIVE
BLOOD URINE: NEGATIVE
COLOR: NORMAL
CREAT 24H UR-MCNC: 1.5 G/24 H
CREAT ?TM UR-MCNC: 52 MG/DL
GLUCOSE QUALITATIVE U: ABNORMAL
HYALINE CASTS: 0 /LPF
KETONES URINE: NEGATIVE
LEUKOCYTE ESTERASE URINE: NEGATIVE
MICROSCOPIC-UA: NORMAL
NITRITE URINE: NEGATIVE
PH URINE: 6
PROT 24H UR-MRATE: 34 MG/DL
PROT ?TM UR-MCNC: 24 HR
PROT UR-MCNC: 994 MG/24 H
PROTEIN URINE: NORMAL
PSA FREE FLD-MCNC: 33 %
PSA FREE SERPL-MCNC: 1.48 NG/ML
PSA SERPL-MCNC: 4.46 NG/ML
RED BLOOD CELLS URINE: 0 /HPF
SPECIFIC GRAVITY URINE: 1.02
SPECIMEN VOL 24H UR: 2925 ML
SQUAMOUS EPITHELIAL CELLS: 0 /HPF
UROBILINOGEN URINE: NORMAL
WHITE BLOOD CELLS URINE: 0 /HPF

## 2019-04-11 NOTE — PATIENT PROFILE ADULT. - NSSUBSTANCEUSE_GEN_ALL_CORE_SD
POC reviewed with pt, acknowledged understanding. Pt AAO x 4. Pt remains free of falls/injuries. Pt on telemetry remains SR. Pt tolerating npo diet. Pt pain controlled with prescribed meds. Pt wearing SCDs. Pty on 2 L NC, denies SOB.  No acute events throughout shift. No distress noted, will continue to monitor.     pt sedated and intubated

## 2021-01-09 NOTE — AIRWAY REMOVAL NOTE  ADULT & PEDS - RESPIRATORY EXPANSION/ACCESSORY MUSCLES/RETRACTIONS
no retractions/no use of accessory muscles/expansion symmetric
no abdominal pain/no diarrhea/no nausea/no vomiting

## 2022-07-11 ENCOUNTER — APPOINTMENT (OUTPATIENT)
Dept: NEUROLOGY | Facility: CLINIC | Age: 67
End: 2022-07-11

## 2022-07-11 VITALS
BODY MASS INDEX: 24.59 KG/M2 | HEART RATE: 74 BPM | DIASTOLIC BLOOD PRESSURE: 77 MMHG | WEIGHT: 153 LBS | SYSTOLIC BLOOD PRESSURE: 121 MMHG | HEIGHT: 66 IN

## 2022-07-11 DIAGNOSIS — G56.21 LESION OF ULNAR NERVE, RIGHT UPPER LIMB: ICD-10-CM

## 2022-07-11 PROCEDURE — 99205 OFFICE O/P NEW HI 60 MIN: CPT

## 2022-07-11 NOTE — PHYSICAL EXAM
[General Appearance - Alert] : alert [Oriented To Time, Place, And Person] : oriented to person, place, and time [Person] : oriented to person [Place] : oriented to place [Time] : oriented to time [Short Term Intact] : short term memory intact [Fluency] : fluency intact [Current Events] : adequate knowledge of current events [Cranial Nerves Optic (II)] : visual acuity intact bilaterally,  visual fields full to confrontation, pupils equal round and reactive to light [Cranial Nerves Oculomotor (III)] : extraocular motion intact [Cranial Nerves Vestibulocochlear (VIII)] : hearing was intact bilaterally [Cranial Nerves Accessory (XI - Cranial And Spinal)] : head turning and shoulder shrug symmetric [Motor Tone] : muscle tone was normal in all four extremities [Motor Strength] : muscle strength was normal in all four extremities [Sensation Tactile Decrease] : light touch was intact [Abnormal Walk] : normal gait [Coordination - Dysmetria Impaired Finger-to-Nose Bilateral] : not present [1+] : Patella left 1+ [FreeTextEntry6] : Right flexor carpi ulnaris, first dorsal interosseous were 5 out of 5 and finger flexors of the fourth and fifth digits were 4-5.

## 2022-07-11 NOTE — HISTORY OF PRESENT ILLNESS
[FreeTextEntry1] : 66-year-old gentleman who is here for initial consultation of right fourth and fifth finger numbness for a year.  Patient underwent coronary stent placement where they had entered through the vessels in his right arm.  Patient states that since then patient has noticed numbness in the fourth and fifth digits on the right side.  Patient describes some weakness when he is using his chopsticks or when he is writing.  Patient is not able to recall if his symptoms have worsened or whether it has been stable since onset.

## 2022-07-11 NOTE — DISCUSSION/SUMMARY
[FreeTextEntry1] : 66-year-old gentleman who is here for initial consultation of right ulnar neuropathy with mostly sensory but some motor involvement.  We will have him return for nerve conduction and EMG studies.  We will have staff put him on the cancellation list for earlier availability.\par \par I spent the time noted on the day of this patient encounter preparing for, providing and documenting the above E/M service and counseling and educate patient on differential, workup, disease course, and treatment/management. Education was provided to the patient during this encounter. All questions and concerns were answered and addressed in detail. The patient verbalized understanding and agreed to plan. Patient was advised to continue to monitor for neurologic symptoms and to notify my office or go to the nearest emergency room if there are any changes. Any orders/referrals and communications were provided as well. \par Side effects of the above medications were discussed in detail including but not limited to applicable black box warning and teratogenicity as appropriate. \par Patient was advised to bring previous records to my office. \par \par \par

## 2022-09-25 ENCOUNTER — INPATIENT (INPATIENT)
Facility: HOSPITAL | Age: 67
LOS: 3 days | Discharge: HOME | End: 2022-09-29
Attending: INTERNAL MEDICINE | Admitting: INTERNAL MEDICINE

## 2022-09-25 VITALS
HEART RATE: 112 BPM | TEMPERATURE: 100 F | SYSTOLIC BLOOD PRESSURE: 115 MMHG | DIASTOLIC BLOOD PRESSURE: 66 MMHG | OXYGEN SATURATION: 100 % | RESPIRATION RATE: 20 BRPM | HEIGHT: 66 IN

## 2022-09-25 DIAGNOSIS — A41.9 SEPSIS, UNSPECIFIED ORGANISM: ICD-10-CM

## 2022-09-25 DIAGNOSIS — I25.10 ATHEROSCLEROTIC HEART DISEASE OF NATIVE CORONARY ARTERY WITHOUT ANGINA PECTORIS: ICD-10-CM

## 2022-09-25 DIAGNOSIS — E78.5 HYPERLIPIDEMIA, UNSPECIFIED: ICD-10-CM

## 2022-09-25 DIAGNOSIS — Z90.49 ACQUIRED ABSENCE OF OTHER SPECIFIED PARTS OF DIGESTIVE TRACT: Chronic | ICD-10-CM

## 2022-09-25 DIAGNOSIS — N20.0 CALCULUS OF KIDNEY: ICD-10-CM

## 2022-09-25 DIAGNOSIS — E11.9 TYPE 2 DIABETES MELLITUS WITHOUT COMPLICATIONS: ICD-10-CM

## 2022-09-25 DIAGNOSIS — I10 ESSENTIAL (PRIMARY) HYPERTENSION: ICD-10-CM

## 2022-09-25 DIAGNOSIS — N39.0 URINARY TRACT INFECTION, SITE NOT SPECIFIED: ICD-10-CM

## 2022-09-25 DIAGNOSIS — Z29.9 ENCOUNTER FOR PROPHYLACTIC MEASURES, UNSPECIFIED: ICD-10-CM

## 2022-09-25 DIAGNOSIS — Z98.890 OTHER SPECIFIED POSTPROCEDURAL STATES: Chronic | ICD-10-CM

## 2022-09-25 LAB
ALBUMIN SERPL ELPH-MCNC: 4.7 G/DL — SIGNIFICANT CHANGE UP (ref 3.3–5)
ALP SERPL-CCNC: 28 U/L — LOW (ref 40–120)
ALT FLD-CCNC: 12 U/L — SIGNIFICANT CHANGE UP (ref 4–41)
ANION GAP SERPL CALC-SCNC: 13 MMOL/L — SIGNIFICANT CHANGE UP (ref 7–14)
APPEARANCE UR: CLEAR — SIGNIFICANT CHANGE UP
APTT BLD: 25.7 SEC — LOW (ref 27–36.3)
AST SERPL-CCNC: 13 U/L — SIGNIFICANT CHANGE UP (ref 4–40)
B PERT DNA SPEC QL NAA+PROBE: SIGNIFICANT CHANGE UP
B PERT+PARAPERT DNA PNL SPEC NAA+PROBE: SIGNIFICANT CHANGE UP
BACTERIA # UR AUTO: ABNORMAL
BASE EXCESS BLDV CALC-SCNC: 3.9 MMOL/L — HIGH (ref -2–3)
BASOPHILS # BLD AUTO: 0.03 K/UL — SIGNIFICANT CHANGE UP (ref 0–0.2)
BASOPHILS NFR BLD AUTO: 0.2 % — SIGNIFICANT CHANGE UP (ref 0–2)
BILIRUB SERPL-MCNC: 0.8 MG/DL — SIGNIFICANT CHANGE UP (ref 0.2–1.2)
BILIRUB UR-MCNC: NEGATIVE — SIGNIFICANT CHANGE UP
BLD GP AB SCN SERPL QL: NEGATIVE — SIGNIFICANT CHANGE UP
BLOOD GAS VENOUS COMPREHENSIVE RESULT: SIGNIFICANT CHANGE UP
BORDETELLA PARAPERTUSSIS (RAPRVP): SIGNIFICANT CHANGE UP
BUN SERPL-MCNC: 30 MG/DL — HIGH (ref 7–23)
C PNEUM DNA SPEC QL NAA+PROBE: SIGNIFICANT CHANGE UP
CALCIUM SERPL-MCNC: 10.2 MG/DL — SIGNIFICANT CHANGE UP (ref 8.4–10.5)
CHLORIDE BLDV-SCNC: 106 MMOL/L — SIGNIFICANT CHANGE UP (ref 96–108)
CHLORIDE SERPL-SCNC: 104 MMOL/L — SIGNIFICANT CHANGE UP (ref 98–107)
CO2 BLDV-SCNC: 28.9 MMOL/L — HIGH (ref 22–26)
CO2 SERPL-SCNC: 25 MMOL/L — SIGNIFICANT CHANGE UP (ref 22–31)
COLOR SPEC: SIGNIFICANT CHANGE UP
CREAT SERPL-MCNC: 1.22 MG/DL — SIGNIFICANT CHANGE UP (ref 0.5–1.3)
DIFF PNL FLD: ABNORMAL
EGFR: 65 ML/MIN/1.73M2 — SIGNIFICANT CHANGE UP
EOSINOPHIL # BLD AUTO: 0 K/UL — SIGNIFICANT CHANGE UP (ref 0–0.5)
EOSINOPHIL NFR BLD AUTO: 0 % — SIGNIFICANT CHANGE UP (ref 0–6)
EPI CELLS # UR: 0 /HPF — SIGNIFICANT CHANGE UP (ref 0–5)
FLUAV SUBTYP SPEC NAA+PROBE: SIGNIFICANT CHANGE UP
FLUBV RNA SPEC QL NAA+PROBE: SIGNIFICANT CHANGE UP
GAS PNL BLDV: 138 MMOL/L — SIGNIFICANT CHANGE UP (ref 136–145)
GLUCOSE BLDC GLUCOMTR-MCNC: 154 MG/DL — HIGH (ref 70–99)
GLUCOSE BLDC GLUCOMTR-MCNC: 159 MG/DL — HIGH (ref 70–99)
GLUCOSE BLDC GLUCOMTR-MCNC: 173 MG/DL — HIGH (ref 70–99)
GLUCOSE BLDC GLUCOMTR-MCNC: 183 MG/DL — HIGH (ref 70–99)
GLUCOSE BLDV-MCNC: 186 MG/DL — HIGH (ref 70–99)
GLUCOSE SERPL-MCNC: 188 MG/DL — HIGH (ref 70–99)
GLUCOSE UR QL: ABNORMAL
HADV DNA SPEC QL NAA+PROBE: SIGNIFICANT CHANGE UP
HCO3 BLDV-SCNC: 28 MMOL/L — SIGNIFICANT CHANGE UP (ref 22–29)
HCOV 229E RNA SPEC QL NAA+PROBE: SIGNIFICANT CHANGE UP
HCOV HKU1 RNA SPEC QL NAA+PROBE: SIGNIFICANT CHANGE UP
HCOV NL63 RNA SPEC QL NAA+PROBE: SIGNIFICANT CHANGE UP
HCOV OC43 RNA SPEC QL NAA+PROBE: SIGNIFICANT CHANGE UP
HCT VFR BLD CALC: 40.3 % — SIGNIFICANT CHANGE UP (ref 39–50)
HCT VFR BLD CALC: 45.1 % — SIGNIFICANT CHANGE UP (ref 39–50)
HCT VFR BLDA CALC: 44 % — SIGNIFICANT CHANGE UP (ref 39–51)
HGB BLD CALC-MCNC: 14.6 G/DL — SIGNIFICANT CHANGE UP (ref 13–17)
HGB BLD-MCNC: 13.4 G/DL — SIGNIFICANT CHANGE UP (ref 13–17)
HGB BLD-MCNC: 15.1 G/DL — SIGNIFICANT CHANGE UP (ref 13–17)
HMPV RNA SPEC QL NAA+PROBE: SIGNIFICANT CHANGE UP
HPIV1 RNA SPEC QL NAA+PROBE: SIGNIFICANT CHANGE UP
HPIV2 RNA SPEC QL NAA+PROBE: SIGNIFICANT CHANGE UP
HPIV3 RNA SPEC QL NAA+PROBE: SIGNIFICANT CHANGE UP
HPIV4 RNA SPEC QL NAA+PROBE: SIGNIFICANT CHANGE UP
HYALINE CASTS # UR AUTO: 1 /LPF — SIGNIFICANT CHANGE UP (ref 0–7)
IANC: 15.69 K/UL — HIGH (ref 1.8–7.4)
IMM GRANULOCYTES NFR BLD AUTO: 0.6 % — SIGNIFICANT CHANGE UP (ref 0–0.9)
INR BLD: 1.08 RATIO — SIGNIFICANT CHANGE UP (ref 0.88–1.16)
KETONES UR-MCNC: ABNORMAL
LACTATE BLDV-MCNC: 2.1 MMOL/L — HIGH (ref 0.5–2)
LACTATE SERPL-SCNC: 1.5 MMOL/L — SIGNIFICANT CHANGE UP (ref 0.5–2)
LEUKOCYTE ESTERASE UR-ACNC: ABNORMAL
LYMPHOCYTES # BLD AUTO: 0.74 K/UL — LOW (ref 1–3.3)
LYMPHOCYTES # BLD AUTO: 4.2 % — LOW (ref 13–44)
M PNEUMO DNA SPEC QL NAA+PROBE: SIGNIFICANT CHANGE UP
MCHC RBC-ENTMCNC: 29.8 PG — SIGNIFICANT CHANGE UP (ref 27–34)
MCHC RBC-ENTMCNC: 30.4 PG — SIGNIFICANT CHANGE UP (ref 27–34)
MCHC RBC-ENTMCNC: 33.3 GM/DL — SIGNIFICANT CHANGE UP (ref 32–36)
MCHC RBC-ENTMCNC: 33.5 GM/DL — SIGNIFICANT CHANGE UP (ref 32–36)
MCV RBC AUTO: 89.1 FL — SIGNIFICANT CHANGE UP (ref 80–100)
MCV RBC AUTO: 91.4 FL — SIGNIFICANT CHANGE UP (ref 80–100)
MONOCYTES # BLD AUTO: 1.17 K/UL — HIGH (ref 0–0.9)
MONOCYTES NFR BLD AUTO: 6.6 % — SIGNIFICANT CHANGE UP (ref 2–14)
NEUTROPHILS # BLD AUTO: 15.69 K/UL — HIGH (ref 1.8–7.4)
NEUTROPHILS NFR BLD AUTO: 88.4 % — HIGH (ref 43–77)
NITRITE UR-MCNC: POSITIVE
NRBC # BLD: 0 /100 WBCS — SIGNIFICANT CHANGE UP (ref 0–0)
NRBC # BLD: 0 /100 WBCS — SIGNIFICANT CHANGE UP (ref 0–0)
NRBC # FLD: 0 K/UL — SIGNIFICANT CHANGE UP (ref 0–0)
NRBC # FLD: 0 K/UL — SIGNIFICANT CHANGE UP (ref 0–0)
PCO2 BLDV: 38 MMHG — LOW (ref 42–55)
PH BLDV: 7.47 — HIGH (ref 7.32–7.43)
PH UR: 6 — SIGNIFICANT CHANGE UP (ref 5–8)
PLATELET # BLD AUTO: 167 K/UL — SIGNIFICANT CHANGE UP (ref 150–400)
PLATELET # BLD AUTO: 176 K/UL — SIGNIFICANT CHANGE UP (ref 150–400)
PO2 BLDV: 102 MMHG — SIGNIFICANT CHANGE UP
POTASSIUM BLDV-SCNC: 3.3 MMOL/L — LOW (ref 3.5–5.1)
POTASSIUM SERPL-MCNC: 3.4 MMOL/L — LOW (ref 3.5–5.3)
POTASSIUM SERPL-SCNC: 3.4 MMOL/L — LOW (ref 3.5–5.3)
PROT SERPL-MCNC: 7.5 G/DL — SIGNIFICANT CHANGE UP (ref 6–8.3)
PROT UR-MCNC: ABNORMAL
PROTHROM AB SERPL-ACNC: 12.5 SEC — SIGNIFICANT CHANGE UP (ref 10.5–13.4)
RAPID RVP RESULT: SIGNIFICANT CHANGE UP
RBC # BLD: 4.41 M/UL — SIGNIFICANT CHANGE UP (ref 4.2–5.8)
RBC # BLD: 5.06 M/UL — SIGNIFICANT CHANGE UP (ref 4.2–5.8)
RBC # FLD: 12.7 % — SIGNIFICANT CHANGE UP (ref 10.3–14.5)
RBC # FLD: 13.4 % — SIGNIFICANT CHANGE UP (ref 10.3–14.5)
RBC CASTS # UR COMP ASSIST: 1 /HPF — SIGNIFICANT CHANGE UP (ref 0–4)
RH IG SCN BLD-IMP: POSITIVE — SIGNIFICANT CHANGE UP
RSV RNA SPEC QL NAA+PROBE: SIGNIFICANT CHANGE UP
RV+EV RNA SPEC QL NAA+PROBE: SIGNIFICANT CHANGE UP
SAO2 % BLDV: 96.7 % — SIGNIFICANT CHANGE UP
SARS-COV-2 RNA SPEC QL NAA+PROBE: SIGNIFICANT CHANGE UP
SODIUM SERPL-SCNC: 142 MMOL/L — SIGNIFICANT CHANGE UP (ref 135–145)
SP GR SPEC: 1.02 — SIGNIFICANT CHANGE UP (ref 1.01–1.05)
UROBILINOGEN FLD QL: SIGNIFICANT CHANGE UP
WBC # BLD: 16.5 K/UL — HIGH (ref 3.8–10.5)
WBC # BLD: 17.73 K/UL — HIGH (ref 3.8–10.5)
WBC # FLD AUTO: 16.5 K/UL — HIGH (ref 3.8–10.5)
WBC # FLD AUTO: 17.73 K/UL — HIGH (ref 3.8–10.5)
WBC UR QL: 74 /HPF — HIGH (ref 0–5)

## 2022-09-25 PROCEDURE — 99223 1ST HOSP IP/OBS HIGH 75: CPT

## 2022-09-25 PROCEDURE — 74177 CT ABD & PELVIS W/CONTRAST: CPT | Mod: 26

## 2022-09-25 PROCEDURE — 99285 EMERGENCY DEPT VISIT HI MDM: CPT

## 2022-09-25 PROCEDURE — 71045 X-RAY EXAM CHEST 1 VIEW: CPT | Mod: 26

## 2022-09-25 RX ORDER — INSULIN LISPRO 100/ML
VIAL (ML) SUBCUTANEOUS AT BEDTIME
Refills: 0 | Status: DISCONTINUED | OUTPATIENT
Start: 2022-09-25 | End: 2022-09-29

## 2022-09-25 RX ORDER — POTASSIUM CHLORIDE 20 MEQ
20 PACKET (EA) ORAL ONCE
Refills: 0 | Status: COMPLETED | OUTPATIENT
Start: 2022-09-25 | End: 2022-09-25

## 2022-09-25 RX ORDER — ASPIRIN/CALCIUM CARB/MAGNESIUM 324 MG
1 TABLET ORAL
Qty: 0 | Refills: 0 | DISCHARGE

## 2022-09-25 RX ORDER — DEXTROSE 50 % IN WATER 50 %
25 SYRINGE (ML) INTRAVENOUS ONCE
Refills: 0 | Status: DISCONTINUED | OUTPATIENT
Start: 2022-09-25 | End: 2022-09-29

## 2022-09-25 RX ORDER — CLOPIDOGREL BISULFATE 75 MG/1
75 TABLET, FILM COATED ORAL DAILY
Refills: 0 | Status: DISCONTINUED | OUTPATIENT
Start: 2022-09-25 | End: 2022-09-29

## 2022-09-25 RX ORDER — ASPIRIN/CALCIUM CARB/MAGNESIUM 324 MG
81 TABLET ORAL DAILY
Refills: 0 | Status: DISCONTINUED | OUTPATIENT
Start: 2022-09-25 | End: 2022-09-29

## 2022-09-25 RX ORDER — DEXTROSE 50 % IN WATER 50 %
15 SYRINGE (ML) INTRAVENOUS ONCE
Refills: 0 | Status: DISCONTINUED | OUTPATIENT
Start: 2022-09-25 | End: 2022-09-29

## 2022-09-25 RX ORDER — EMPAGLIFLOZIN 10 MG/1
1 TABLET, FILM COATED ORAL
Qty: 0 | Refills: 0 | DISCHARGE

## 2022-09-25 RX ORDER — CEFTRIAXONE 500 MG/1
1000 INJECTION, POWDER, FOR SOLUTION INTRAMUSCULAR; INTRAVENOUS EVERY 24 HOURS
Refills: 0 | Status: DISCONTINUED | OUTPATIENT
Start: 2022-09-26 | End: 2022-09-28

## 2022-09-25 RX ORDER — ACETAMINOPHEN 500 MG
650 TABLET ORAL EVERY 6 HOURS
Refills: 0 | Status: DISCONTINUED | OUTPATIENT
Start: 2022-09-25 | End: 2022-09-29

## 2022-09-25 RX ORDER — CEFTRIAXONE 500 MG/1
1000 INJECTION, POWDER, FOR SOLUTION INTRAMUSCULAR; INTRAVENOUS ONCE
Refills: 0 | Status: COMPLETED | OUTPATIENT
Start: 2022-09-25 | End: 2022-09-25

## 2022-09-25 RX ORDER — SODIUM CHLORIDE 9 MG/ML
1000 INJECTION INTRAMUSCULAR; INTRAVENOUS; SUBCUTANEOUS
Refills: 0 | Status: DISCONTINUED | OUTPATIENT
Start: 2022-09-25 | End: 2022-09-29

## 2022-09-25 RX ORDER — METFORMIN HYDROCHLORIDE 850 MG/1
1 TABLET ORAL
Qty: 0 | Refills: 0 | DISCHARGE

## 2022-09-25 RX ORDER — SODIUM CHLORIDE 9 MG/ML
1000 INJECTION, SOLUTION INTRAVENOUS
Refills: 0 | Status: DISCONTINUED | OUTPATIENT
Start: 2022-09-25 | End: 2022-09-29

## 2022-09-25 RX ORDER — SIMVASTATIN 20 MG/1
20 TABLET, FILM COATED ORAL AT BEDTIME
Refills: 0 | Status: DISCONTINUED | OUTPATIENT
Start: 2022-09-25 | End: 2022-09-29

## 2022-09-25 RX ORDER — GLUCAGON INJECTION, SOLUTION 0.5 MG/.1ML
1 INJECTION, SOLUTION SUBCUTANEOUS ONCE
Refills: 0 | Status: DISCONTINUED | OUTPATIENT
Start: 2022-09-25 | End: 2022-09-25

## 2022-09-25 RX ORDER — CLOPIDOGREL BISULFATE 75 MG/1
1 TABLET, FILM COATED ORAL
Qty: 0 | Refills: 0 | DISCHARGE

## 2022-09-25 RX ORDER — FENOFIBRATE,MICRONIZED 130 MG
145 CAPSULE ORAL AT BEDTIME
Refills: 0 | Status: DISCONTINUED | OUTPATIENT
Start: 2022-09-25 | End: 2022-09-29

## 2022-09-25 RX ORDER — DEXTROSE 50 % IN WATER 50 %
12.5 SYRINGE (ML) INTRAVENOUS ONCE
Refills: 0 | Status: DISCONTINUED | OUTPATIENT
Start: 2022-09-25 | End: 2022-09-29

## 2022-09-25 RX ORDER — ONDANSETRON 8 MG/1
4 TABLET, FILM COATED ORAL EVERY 8 HOURS
Refills: 0 | Status: DISCONTINUED | OUTPATIENT
Start: 2022-09-25 | End: 2022-09-29

## 2022-09-25 RX ORDER — TAMSULOSIN HYDROCHLORIDE 0.4 MG/1
0.4 CAPSULE ORAL AT BEDTIME
Refills: 0 | Status: DISCONTINUED | OUTPATIENT
Start: 2022-09-25 | End: 2022-09-29

## 2022-09-25 RX ORDER — SODIUM CHLORIDE 9 MG/ML
1500 INJECTION INTRAMUSCULAR; INTRAVENOUS; SUBCUTANEOUS ONCE
Refills: 0 | Status: COMPLETED | OUTPATIENT
Start: 2022-09-25 | End: 2022-09-25

## 2022-09-25 RX ORDER — FINASTERIDE 5 MG/1
1 TABLET, FILM COATED ORAL
Qty: 0 | Refills: 0 | DISCHARGE

## 2022-09-25 RX ORDER — INSULIN LISPRO 100/ML
VIAL (ML) SUBCUTANEOUS
Refills: 0 | Status: DISCONTINUED | OUTPATIENT
Start: 2022-09-25 | End: 2022-09-29

## 2022-09-25 RX ORDER — ACETAMINOPHEN 500 MG
975 TABLET ORAL ONCE
Refills: 0 | Status: COMPLETED | OUTPATIENT
Start: 2022-09-25 | End: 2022-09-25

## 2022-09-25 RX ORDER — FINASTERIDE 5 MG/1
5 TABLET, FILM COATED ORAL DAILY
Refills: 0 | Status: DISCONTINUED | OUTPATIENT
Start: 2022-09-25 | End: 2022-09-29

## 2022-09-25 RX ORDER — SIMVASTATIN 20 MG/1
1 TABLET, FILM COATED ORAL
Qty: 0 | Refills: 0 | DISCHARGE

## 2022-09-25 RX ADMIN — Medication 81 MILLIGRAM(S): at 13:12

## 2022-09-25 RX ADMIN — SODIUM CHLORIDE 1500 MILLILITER(S): 9 INJECTION INTRAMUSCULAR; INTRAVENOUS; SUBCUTANEOUS at 04:23

## 2022-09-25 RX ADMIN — Medication 975 MILLIGRAM(S): at 08:22

## 2022-09-25 RX ADMIN — TAMSULOSIN HYDROCHLORIDE 0.4 MILLIGRAM(S): 0.4 CAPSULE ORAL at 21:16

## 2022-09-25 RX ADMIN — Medication 975 MILLIGRAM(S): at 04:25

## 2022-09-25 RX ADMIN — Medication 1: at 13:50

## 2022-09-25 RX ADMIN — Medication 145 MILLIGRAM(S): at 21:16

## 2022-09-25 RX ADMIN — CLOPIDOGREL BISULFATE 75 MILLIGRAM(S): 75 TABLET, FILM COATED ORAL at 13:09

## 2022-09-25 RX ADMIN — Medication 1: at 17:12

## 2022-09-25 RX ADMIN — ONDANSETRON 4 MILLIGRAM(S): 8 TABLET, FILM COATED ORAL at 13:09

## 2022-09-25 RX ADMIN — Medication 20 MILLIEQUIVALENT(S): at 13:08

## 2022-09-25 RX ADMIN — SODIUM CHLORIDE 75 MILLILITER(S): 9 INJECTION INTRAMUSCULAR; INTRAVENOUS; SUBCUTANEOUS at 13:19

## 2022-09-25 RX ADMIN — SIMVASTATIN 20 MILLIGRAM(S): 20 TABLET, FILM COATED ORAL at 21:16

## 2022-09-25 RX ADMIN — FINASTERIDE 5 MILLIGRAM(S): 5 TABLET, FILM COATED ORAL at 13:09

## 2022-09-25 RX ADMIN — CEFTRIAXONE 100 MILLIGRAM(S): 500 INJECTION, POWDER, FOR SOLUTION INTRAMUSCULAR; INTRAVENOUS at 04:23

## 2022-09-25 NOTE — ED PROVIDER NOTE - PHYSICAL EXAMINATION
PHYSICAL EXAM:  GENERAL: ill appearing; in no respiratory distress  HEENT: Atraumatic, Normocephalic, PERRL, EOMs intact b/l w/out deficits, no conjunctival pallor, MMM  NECK: No JVD; FROM  CHEST/LUNG: CTAB no wheezes/rhonchi/rales  HEART: tachycardic, no murmur/gallops/rubs  ABDOMEN: +BS, soft, NT, ND, no CVAT  EXTREMITIES: No LE edema, +2 radial pulses b/l, +2 DP/PT pulses b/l  MUSCULOSKELETAL: FROM of all 4 extremities  NERVOUS SYSTEM:  A&Ox3, No motor deficits or sensory deficits; no focal neurologic deficits  SKIN:  No new rashes

## 2022-09-25 NOTE — H&P ADULT - NSHPLABSRESULTS_GEN_ALL_CORE
15.1   17.73 )-----------( 176      ( 25 Sep 2022 03:13 )             45.1   09-25    142  |  104  |  30<H>  ----------------------------<  188<H>  3.4<L>   |  25  |  1.22    Ca    10.2      25 Sep 2022 03:13    TPro  7.5  /  Alb  4.7  /  TBili  0.8  /  DBili  x   /  AST  13  /  ALT  12  /  AlkPhos  28<L>  09-25    UA 74 WBC, many bacteria, large leuk esterast, nitrite +  UCx pending in lab  BCx x 2 pending in lab    Lactate 2.1->1.5    RVP neg    EKG: sinus tachycardia @ 107bpm, QTc 445, no acute ischemic changes noted; grossly unchanged compared to prior EKG from 2017    CXR: clear lungs

## 2022-09-25 NOTE — ED PROVIDER NOTE - OBJECTIVE STATEMENT
65 y/o M, PMH of DM, HTN, CAD s/p 1 stent, HLD, and Kidney stone requiring stenting, presents to ED c/o fever a/w generalized weakness, nausea/vomiting, and chills x 1 day. Pt denies recent illness/travel/sick contacts. Does note he recently received a covid booster. Denies urinary sx, abd pain, back pain, weakness/numbness, lightheadedness/dizziness, or any other symptoms at this time.

## 2022-09-25 NOTE — H&P ADULT - NSHPOUTPATIENTPROVIDERS_GEN_ALL_CORE
GENERAL SURGERY DISCHARGE INSTRUCTIONS  1. For the first 3-5 days after surgery you will be prescribed Norco or Percocet as needed for pain. These medications contain a narcotic and tylenol (acetaminophen). Do not take additional over-the-counter Tylenol while taking the prescription Norco or Percocet for your pain.  2. No driving while taking narcotic pain medications  3. You should consider taking an over-the-counter stool softener (ex. Colace) while on any narcotic pain medication since narcotics can be constipating.  4.  After 3-5 days, we recommend transitioning to over-the-counter Tylenol and Ibuprofen products for pain management. You may alternate Tylenol and Advil or Motrin every 4 hours for pain relief. If you have a medical condition that it is not recommended to take NSAIDS, then refrain from the Advil or Motrin, and only take Tylenol as needed for pain. The maximum dose of Tylenol is 4,000 mg in 24 hours.  5. You may shower, but no baths or hot tubs for 2 weeks, until the incisions are completely healed.  6. Your incisions are covered with Dermabond or Steri-Strips which will fall off in 1-3 weeks   7. No heavy lifting greater than 20 lbs for a minimum of 2 weeks.  8. During the COVID pandemic, we are completing more postop visits via TELEPHONE. You can expect a call from our Physician Assistants or Nurse Practitioners. If during that visit we have any postoperative concerns, we will then schedule you for an in-person clinic visit.   9. In the interim, please contact our office if you develop a fever greater than 100.4F, severe nausea/vomiting, increased abdominal bloating, persistent or worsening abdominal pain not improving with pain medication, or redness/drainage from incisions, or any other concerns.    The Acute Care Service works on a team approach.  Therefore, the Surgeon or Advanced Practice Clinician (Nurse Practitioner or Physician Assistant) you see during your hospital stay may not be the  same provider that you see during your outpatient clinic visit.  This is to ensure that our patients are seen in a timely manner.  Our surgeons communicate regarding patient cases and are all well informed of your medical status.    Dr. Megan Alberto PMD

## 2022-09-25 NOTE — ED ADULT NURSE REASSESSMENT NOTE - NS ED NURSE REASSESS COMMENT FT1
Patient c/o seeing blood in his urine times two this afternoon. MD aware.   WANDER Miranda
Received report from PM RN. Patient is alert and oriented times four. Pt comfortable and waiting for bed assignment. Pt ate breakfast.  WANDER Miranda

## 2022-09-25 NOTE — ED PROVIDER NOTE - NS ED ROS FT
Gen: Denies, weight loss; +fever, +chills, +generalized weakness  HEENT: Denies vision changes, ear pain, epistaxis, sore throat  CV: Denies chest pain, palpitations  Skin: Denies rash, erythema, color changes  Resp: Denies SOB, cough  Endo: Denies sensitivity to heat, cold, increased urination  GI: Denies abdominal pain, constipation, diarrhea; +nausea, +vomiting  Msk: Denies back pain, LE swelling, extremity pain  : Denies dysuria, increased frequency  Neuro: Denies LOC, weakness, numbness, tingling  Psych: Denies hx of psych, hallucinations  ROS statement: all other ROS negative except as per HPI

## 2022-09-25 NOTE — ED ADULT NURSE NOTE - OBJECTIVE STATEMENT
pt received to rm 22  , a&ox4  , ambulatory , pmh of diabetes and HTN , p/w weakness, nausea, and chills x 1 day  . Pt breathing even and unlabored on room air. NSR on cardiac monitor. Denies cough, SOB, chest pain, palpitations, dizziness, D, constipation, numbness, tingling. IV placed. Labs collected and sent. pt educated on fall precautions and confirms understanding via teach back method. Stretcher locked in lowest position with siderails up x2. Call bell and personal items within reach.

## 2022-09-25 NOTE — ED PROVIDER NOTE - NS ED MD DISPO ISOLATION TYPES
Telephone Encounter by Sil Israel CMA at 03/23/17 09:05 AM     Author:  Sil Israel CMA Service:  (none) Author Type:  Certified Medical Assistant     Filed:  03/23/17 09:05 AM Encounter Date:  3/14/2017 Status:  Signed     :  Sil Israel CMA (Jorge A Medical Assistant)            Message closed per protocol  3 messages left for call back and trying to reach you letter was sent.[BA1.1M]       Revision History        User Key Date/Time User Provider Type Action    > BA1.1 03/23/17 09:05 AM Sil Israel CMA Certified Medical Assistant Sign    M - Manual             None

## 2022-09-25 NOTE — CHART NOTE - NSCHARTNOTEFT_GEN_A_CORE
Called back by urology team who reviewed prelim CT A/P images - per their review, no concerning hydro, no stone, no need for OR/ intervention or involvement at this time.   They recommended following up final read and to call back if any concerning findings that were missed on their read.   Additionally, patient re-assessed and found to be comfortable appearing after he said he voided large amount of urine, however still with gross hematuria.   He is hemodynamically stable at this time. Will check repeat CBC this evening.   If any concerning drop in Hgb or hemodynamic instability or evidence of retention, will call  back again to request help with management and formal consult.   Above events and plan discussed with Dr. Bueno.

## 2022-09-25 NOTE — H&P ADULT - NS ATTEND AMEND GEN_ALL_CORE FT
66M PMHx CAD s/p PCI on DAPT (Oct 2021), DM, HTN, HLD, kidney stones with prior hx of 4mm R UVJ stone s/p R ureteral stent (removed 1 month later) with associated urospesis/E coli bacteremia in 2017 who presents with weakness, nausea/vomiting, dysuria and chills.     #R/o pyelonephritis, pt with hx of nephrolithiasis with R UVJ stone s/p r ureteral stent (removed) and sepsis 2/2 UTI with bacteremia. Now coming in with weakness, chills, dysuria. Low grade fever to 100.1 in the ED. Does not meet SIRS criteria. He received ceftriaxone x1. On my interview, pt reports that he is now starting to experience bre hematuria in his urine and suprapubic pain. UA is positive. Given clinical picture, concerned for pyelonephritis. C/w ceftriaxone for now. Urology consulted. Pending CTAP with IV contrast - urology says to follow up after CT scan is completed. Tylenol PRN fever. Bladder scan. If retaining, will make urology aware. F/u BCx and UCx. Tylenol PRN fever.    Remainder of plan as stated above. Plan discussed with ACP.

## 2022-09-25 NOTE — CHART NOTE - NSCHARTNOTEFT_GEN_A_CORE
Informed by nurse that bladder scan showed 575 ml and patient endorsed difficulty urinating. Approximately 30 minutes later patient was able to void approximately 200 ml. Urine without hematuria Patient denies pain when urinating. Will recheck bladder scan at 2AM. CTAP showed No renal stones or hydronephrosis. Multiple bilateral indeterminate renal hypodensities. Will continue to monitor closely.

## 2022-09-25 NOTE — H&P ADULT - PROBLEM SELECTOR PLAN 2
- - see above concerning stone history   - follows with nephrology Dr. David Caicedo and urology Dr. J Luis Majano   - check US renal/kidney to start   - low threshold for urology consult and more advanced imaging depending on US findings - see above concerning stone history   - follows with nephrology Dr. David Caicedo and urology Dr. J Luis Majano   - f/u CT A/P as above   - urology consult after CT done

## 2022-09-25 NOTE — H&P ADULT - PROBLEM SELECTOR PLAN 5
- Patient takes metformin and jardiance at home - he states jardiance costs him over $500/90 day supply   - consider alternative therapy that would be more cost effective on discharge   - check HgA1c  - monitor FS AC/HS  - ISS while admitted

## 2022-09-25 NOTE — H&P ADULT - NSICDXPASTMEDICALHX_GEN_ALL_CORE_FT
PAST MEDICAL HISTORY:  CAD (coronary artery disease) s/p PCI in Oct 2021    DM (diabetes mellitus)     HLD (hyperlipidemia)     HTN (hypertension)     Renal stone R UVJ stone 2017, E coli bacteremia, s/p ureteral stent placement/removal

## 2022-09-25 NOTE — ED PROVIDER NOTE - ATTENDING CONTRIBUTION TO CARE
I have personally performed a face to face medical and diagnostic evaluation of the patient. I have discussed with and reviewed the Resident's note and agree with the History, ROS, Physical Exam and MDM unless otherwise indicated. A brief summary of my personal evaluation and impression can be found below.    Ana READ: 66M hx of prior renal stones w stent, DM HTN CAD stents presents with a cc of x1 day sudden onset diffuse weakness nausea, vomiting and fatigue a/w fever and chills. No sick contacts. No abdominal pain, no CP SOB or MCCONNELL no Cough, no urinary sx. No back pain no flank pain.     All other ROS negative, except as above and as per HPI and ROS section.    VITALS: Initial triage and subsequent vitals have been reviewed by me.  GEN APPEARANCE: WDWN, alert, non-toxic, NAD  HEAD: Atraumatic.  EYES: PERRLa, EOMI, vision grossly intact.   NECK: Supple  CV: RRR, S1S2, no c/r/m/g. Cap refill <2 seconds. No bruits.   LUNGS: CTAB. No abnormal breath sounds.  ABDOMEN: Soft, NTND. No guarding or rebound.   MSK/EXT: No spinal or extremity point tenderness. No CVA ttp. Pelvis stable. No peripheral edema.  NEURO: Alert, follows commands. Weight bearing normal. Speech normal. Sensation and motor normal x4 extremities.   SKIN: Warm, dry and intact. No rash.  PSYCH: Appropriate    Plan/MDM: Ana READ: 66M hx of prior renal stones w stent, DM HTN CAD stents presents with a cc of x1 day sudden onset diffuse weakness nausea, vomiting and fatigue a/w fever and chills. No sick contacts. No abdominal pain, no CP SOB or MCCONNELL no Cough, no urinary sx. No back pain no flank pain. exam tachycardic, borderline temp, otherwise non focal exam ddx c/f SIRS possibly 2/2 pna/uti? will get ed sepsis set, labs clx start meds, eval for course, anticipate admission. low c/f surgical pathology as pt has no abdominal ttp on exam.

## 2022-09-25 NOTE — ED PROVIDER NOTE - NSICDXPASTMEDICALHX_GEN_ALL_CORE_FT
PAST MEDICAL HISTORY:  CAD (coronary artery disease)     DM (diabetes mellitus)     HLD (hyperlipidemia)     HTN (hypertension)     Renal stone

## 2022-09-25 NOTE — H&P ADULT - PROBLEM SELECTOR PLAN 1
- Patient with R flank pain 4-5 day ago that resolved, but now with fever, chills, dysuria/frequency  - UA grossly positive with pyuria  - UCx and BCx x 2 sets are both sent and already in lab   - Patient with hx of 4mm R UVJ stone/E coli urosepsis s/p stent placement and removal in 2017  - high risk of decompensation given prior history, will monitor closely  - s/p CTX in ED - will continue with CTX for now (E coli was sensitive to CTX back in 2017)  - no imaging done in ED, check US renal/bladder for further evaluation  - depending on findings, may require more advanced CT imaging and urology consult  - s/p 1.5L IVF in ED - continue gentle IVF with NS @ 75/hr  - monitor closely, q4h vital signs - Patient with R flank pain 4-5 day ago that resolved, but now with fever, chills, dysuria/frequency  - UA grossly positive with pyuria  - UCx and BCx x 2 sets are both sent and already in lab   - Patient with hx of 4mm R UVJ stone/E coli urosepsis s/p stent placement and removal in 2017  - high risk of decompensation given prior history, will monitor closely  - s/p CTX in ED - will continue with CTX for now (E coli was sensitive to CTX back in 2017)  - no imaging done in ED, check US renal/bladder for further evaluation  - depending on findings, may require more advanced CT imaging and urology consult  - s/p 1.5L IVF in ED - continue gentle IVF with NS @ 75/hr  - monitor closely, q4h vital signs  - zofran PRN for intermittent nausea/vomiting - patient tolerating solid food but gets nauseous with liquids, so will order regular diet for now - Patient with R flank pain 4-5 day ago that resolved, but now with fever, chills, dysuria/frequency  - UA grossly positive with pyuria  - UCx and BCx x 2 sets are both sent and already in lab   - Patient with hx of 4mm R UVJ stone/E coli urosepsis s/p stent placement and removal in 2017  - high risk of decompensation given prior history, will monitor closely  - s/p CTX in ED - will continue with CTX for now (E coli was sensitive to CTX back in 2017)  - no imaging done in ED, check CT A/P with IV contrast for further evaluation   - discussed with urology - will request consult with urology after CT imaging done   - s/p 1.5L IVF in ED - continue gentle IVF with NS @ 75/hr  - monitor closely, q4h vital signs  - zofran PRN for intermittent nausea/vomiting - patient tolerating solid food but gets nauseous with liquids, so will order regular diet for now - Patient with R flank pain 4-5 day ago that resolved, but now with fever, chills, dysuria/frequency; upon later assessment in afternoon, patient now with hematuria and urinary urgency   - UA grossly positive with pyuria  - check bladder scan PVR now - per ER nurse, unable to check bladder scan while in ER, will discuss with nurse management as need to rule out retention; if evidence of retention will place benjamin   - UCx and BCx x 2 sets are both sent and already in lab   - Patient with hx of 4mm R UVJ stone/E coli urosepsis s/p stent placement and removal in 2017  - high risk of decompensation given prior history, will monitor closely  - s/p CTX in ED - will continue with CTX for now (E coli was sensitive to CTX back in 2017)  - no imaging done in ED, check urgent CT A/P with IV contrast for further evaluation - I spoke with radiology to obtain protocol and also called ED CT dept to expedite to get study ASAP   - discussed with case with urology x 2, they are awaiting CT results    - s/p 1.5L IVF in ED - continue gentle IVF with NS @ 75/hr  - monitor closely, q4h vital signs  - zofran PRN for intermittent nausea/vomiting - patient tolerating solid food but gets nauseous with liquids, so will order regular diet for now

## 2022-09-25 NOTE — H&P ADULT - MUSCULOSKELETAL
normal/ROM intact/no calf tenderness/normal gait/strength 5/5 bilateral upper extremities/strength 5/5 bilateral lower extremities/back exam details…

## 2022-09-25 NOTE — H&P ADULT - NEGATIVE NEUROLOGICAL SYMPTOMS
no transient paralysis/no weakness/no paresthesias/no focal seizures/no loss of sensation/no difficulty walking/no headache/no confusion

## 2022-09-25 NOTE — H&P ADULT - PROBLEM SELECTOR PLAN 4
- BP stable, low normal  - hold metoprolol and losartan for now in setting of possible sepsis   - restart as tolerated if BP stable

## 2022-09-25 NOTE — ED PROVIDER NOTE - CLINICAL SUMMARY MEDICAL DECISION MAKING FREE TEXT BOX
67 y/o M, PMH of DM, HTN, CAD s/p 1 stent, HLD, and Kidney stone requiring stenting, presents to ED c/o fever a/w generalized weakness, nausea/vomiting, and chills x 1 day.   Pt is febrile and tachycardic. Concerning for infection.  Plan to do infectious w/u including basic labs, UA, RVP, and CXR. Pt will likely require admission.

## 2022-09-25 NOTE — H&P ADULT - PROBLEM SELECTOR PLAN 7
- DVT ppx with Lovenox 40mg daily   - Case and plan to be discussed with attending - DVT ppx with Lovenox 40mg daily   - Case and plan discussed with Dr. Bueno - DVT ppx with SCD's given hematuria   - Case and plan discussed with Dr. Bueno

## 2022-09-25 NOTE — H&P ADULT - HISTORY OF PRESENT ILLNESS
66M PMHx CAD s/p PCI on DAPT (Oct 2021), DM, HTN, HLD, kidney stones with prior hx of 4mm R UVJ stone s/p R ureteral stent (removed 1 month later) with associated urospesis/E coli bacteremia in 2017 who presents with generalized weakness, nausea, chills that started after dinner last night. Patient was in his usual state of health yesterday during the day. He ate dinner and started to feel nausea and threw up once. He went to take a bath to see if he felt better and started to have shaking chills. Wife took his temp and told him he had a fever but was unsure how high. He took a nap afterward without improvement, so he decided to come to ED to get checked out. He endorses intermittent R flank discomfort 4-5 days ago that is now resolved. He admits associated dysuria and urinary frequency that started last night. During interview, patient had several sips of coffee and immediately threw it up. He tolerated breakfast earlier without issue. He otherwise denies CP, SOB, abdominal pain, flank pain, diarrhea, constipation, focal weakness, headache, changes in hearing or vision, trouble ambulating. In the ED he was found to have U.T.I and started on CTX and given 1.5L NS and tylenol. CXR showed clear lungs.

## 2022-09-25 NOTE — ED ADULT TRIAGE NOTE - CHIEF COMPLAINT QUOTE
fever and weakness    pt states was in [place].. had a stress today.  started feeling weak, nauseated, chills after eating dinner. low grade fever- 100.1.. pmhx diabetes, htn.  had covid booster on monday,

## 2022-09-25 NOTE — H&P ADULT - GASTROINTESTINAL
normal/soft/nontender/nondistended/normal active bowel sounds/no guarding/no rigidity/no masses palpable details…

## 2022-09-25 NOTE — PROVIDER CONTACT NOTE (OTHER) - SITUATION
Pt reports bladder discomfort and that they haven't been able to urinate. pt also has a fever of 100.

## 2022-09-25 NOTE — H&P ADULT - ASSESSMENT
66M PMHx CAD s/p PCI on DAPT (Oct 2021), DM, HTN, HLD, kidney stones with prior hx of 4mm R UVJ stone s/p R ureteral stent (removed 1 month later) with associated urospesis/E coli bacteremia in 2017 who presents with generalized weakness, nausea, chills found to have U.T.I.

## 2022-09-25 NOTE — H&P ADULT - PROBLEM SELECTOR PLAN 3
- s/p PCI in [place] 2021 on DAPT with ASA and Plavix  - continue ASA and plavix  - hold metoprolol for the moment in setting of infection, possible sepsis   - continue statin  - no CP, EKG no acute ischemic changes

## 2022-09-25 NOTE — PROVIDER CONTACT NOTE (OTHER) - ASSESSMENT
Pt has a fever of 100. Pt reports bladder discomfort and trouble urinating. BladderScan shows 705mL. provider notified, and after the pt was able to urinate multiple times, another BladderScan was performed. Volume post void was 357mL. Pt denies hematuria.

## 2022-09-26 LAB
A1C WITH ESTIMATED AVERAGE GLUCOSE RESULT: 7.7 % — HIGH (ref 4–5.6)
ALBUMIN SERPL ELPH-MCNC: 4.6 G/DL — SIGNIFICANT CHANGE UP (ref 3.3–5)
ALP SERPL-CCNC: 33 U/L — LOW (ref 40–120)
ALT FLD-CCNC: 14 U/L — SIGNIFICANT CHANGE UP (ref 4–41)
ANION GAP SERPL CALC-SCNC: 16 MMOL/L — HIGH (ref 7–14)
AST SERPL-CCNC: 14 U/L — SIGNIFICANT CHANGE UP (ref 4–40)
BASOPHILS # BLD AUTO: 0.03 K/UL — SIGNIFICANT CHANGE UP (ref 0–0.2)
BASOPHILS NFR BLD AUTO: 0.2 % — SIGNIFICANT CHANGE UP (ref 0–2)
BILIRUB SERPL-MCNC: 0.8 MG/DL — SIGNIFICANT CHANGE UP (ref 0.2–1.2)
BUN SERPL-MCNC: 22 MG/DL — SIGNIFICANT CHANGE UP (ref 7–23)
CALCIUM SERPL-MCNC: 10.1 MG/DL — SIGNIFICANT CHANGE UP (ref 8.4–10.5)
CHLORIDE SERPL-SCNC: 104 MMOL/L — SIGNIFICANT CHANGE UP (ref 98–107)
CHOLEST SERPL-MCNC: 132 MG/DL — SIGNIFICANT CHANGE UP
CO2 SERPL-SCNC: 25 MMOL/L — SIGNIFICANT CHANGE UP (ref 22–31)
CREAT SERPL-MCNC: 1.24 MG/DL — SIGNIFICANT CHANGE UP (ref 0.5–1.3)
EGFR: 63 ML/MIN/1.73M2 — SIGNIFICANT CHANGE UP
EOSINOPHIL # BLD AUTO: 0.04 K/UL — SIGNIFICANT CHANGE UP (ref 0–0.5)
EOSINOPHIL NFR BLD AUTO: 0.3 % — SIGNIFICANT CHANGE UP (ref 0–6)
ESTIMATED AVERAGE GLUCOSE: 174 — SIGNIFICANT CHANGE UP
GLUCOSE BLDC GLUCOMTR-MCNC: 101 MG/DL — HIGH (ref 70–99)
GLUCOSE BLDC GLUCOMTR-MCNC: 163 MG/DL — HIGH (ref 70–99)
GLUCOSE BLDC GLUCOMTR-MCNC: 196 MG/DL — HIGH (ref 70–99)
GLUCOSE BLDC GLUCOMTR-MCNC: 209 MG/DL — HIGH (ref 70–99)
GLUCOSE SERPL-MCNC: 117 MG/DL — HIGH (ref 70–99)
HCT VFR BLD CALC: 44 % — SIGNIFICANT CHANGE UP (ref 39–50)
HCV AB S/CO SERPL IA: 0.08 S/CO — SIGNIFICANT CHANGE UP (ref 0–0.99)
HCV AB SERPL-IMP: SIGNIFICANT CHANGE UP
HDLC SERPL-MCNC: 43 MG/DL — SIGNIFICANT CHANGE UP
HGB BLD-MCNC: 14.2 G/DL — SIGNIFICANT CHANGE UP (ref 13–17)
IANC: 12.56 K/UL — HIGH (ref 1.8–7.4)
IMM GRANULOCYTES NFR BLD AUTO: 0.5 % — SIGNIFICANT CHANGE UP (ref 0–0.9)
LIPID PNL WITH DIRECT LDL SERPL: 53 MG/DL — SIGNIFICANT CHANGE UP
LYMPHOCYTES # BLD AUTO: 14.2 % — SIGNIFICANT CHANGE UP (ref 13–44)
LYMPHOCYTES # BLD AUTO: 2.26 K/UL — SIGNIFICANT CHANGE UP (ref 1–3.3)
MCHC RBC-ENTMCNC: 30.1 PG — SIGNIFICANT CHANGE UP (ref 27–34)
MCHC RBC-ENTMCNC: 32.3 GM/DL — SIGNIFICANT CHANGE UP (ref 32–36)
MCV RBC AUTO: 93.4 FL — SIGNIFICANT CHANGE UP (ref 80–100)
MONOCYTES # BLD AUTO: 0.94 K/UL — HIGH (ref 0–0.9)
MONOCYTES NFR BLD AUTO: 5.9 % — SIGNIFICANT CHANGE UP (ref 2–14)
NEUTROPHILS # BLD AUTO: 12.56 K/UL — HIGH (ref 1.8–7.4)
NEUTROPHILS NFR BLD AUTO: 78.9 % — HIGH (ref 43–77)
NON HDL CHOLESTEROL: 89 MG/DL — SIGNIFICANT CHANGE UP
NRBC # BLD: 0 /100 WBCS — SIGNIFICANT CHANGE UP (ref 0–0)
NRBC # FLD: 0 K/UL — SIGNIFICANT CHANGE UP (ref 0–0)
PLATELET # BLD AUTO: 162 K/UL — SIGNIFICANT CHANGE UP (ref 150–400)
POTASSIUM SERPL-MCNC: 4.2 MMOL/L — SIGNIFICANT CHANGE UP (ref 3.5–5.3)
POTASSIUM SERPL-SCNC: 4.2 MMOL/L — SIGNIFICANT CHANGE UP (ref 3.5–5.3)
PROT SERPL-MCNC: 7.7 G/DL — SIGNIFICANT CHANGE UP (ref 6–8.3)
RBC # BLD: 4.71 M/UL — SIGNIFICANT CHANGE UP (ref 4.2–5.8)
RBC # FLD: 13.3 % — SIGNIFICANT CHANGE UP (ref 10.3–14.5)
SODIUM SERPL-SCNC: 145 MMOL/L — SIGNIFICANT CHANGE UP (ref 135–145)
TRIGL SERPL-MCNC: 182 MG/DL — HIGH
WBC # BLD: 15.91 K/UL — HIGH (ref 3.8–10.5)
WBC # FLD AUTO: 15.91 K/UL — HIGH (ref 3.8–10.5)

## 2022-09-26 PROCEDURE — 99222 1ST HOSP IP/OBS MODERATE 55: CPT

## 2022-09-26 RX ORDER — INFLUENZA VIRUS VACCINE 15; 15; 15; 15 UG/.5ML; UG/.5ML; UG/.5ML; UG/.5ML
0.7 SUSPENSION INTRAMUSCULAR ONCE
Refills: 0 | Status: DISCONTINUED | OUTPATIENT
Start: 2022-09-26 | End: 2022-09-29

## 2022-09-26 RX ADMIN — Medication 1: at 17:11

## 2022-09-26 RX ADMIN — CLOPIDOGREL BISULFATE 75 MILLIGRAM(S): 75 TABLET, FILM COATED ORAL at 12:10

## 2022-09-26 RX ADMIN — TAMSULOSIN HYDROCHLORIDE 0.4 MILLIGRAM(S): 0.4 CAPSULE ORAL at 21:43

## 2022-09-26 RX ADMIN — CEFTRIAXONE 100 MILLIGRAM(S): 500 INJECTION, POWDER, FOR SOLUTION INTRAMUSCULAR; INTRAVENOUS at 04:08

## 2022-09-26 RX ADMIN — Medication 145 MILLIGRAM(S): at 21:44

## 2022-09-26 RX ADMIN — Medication 650 MILLIGRAM(S): at 21:47

## 2022-09-26 RX ADMIN — SIMVASTATIN 20 MILLIGRAM(S): 20 TABLET, FILM COATED ORAL at 21:43

## 2022-09-26 RX ADMIN — Medication 1: at 12:11

## 2022-09-26 RX ADMIN — FINASTERIDE 5 MILLIGRAM(S): 5 TABLET, FILM COATED ORAL at 12:10

## 2022-09-26 RX ADMIN — Medication 650 MILLIGRAM(S): at 22:00

## 2022-09-26 RX ADMIN — Medication 81 MILLIGRAM(S): at 12:21

## 2022-09-26 NOTE — PATIENT PROFILE ADULT - FALL HARM RISK - UNIVERSAL INTERVENTIONS
Bed in lowest position, wheels locked, appropriate side rails in place/Call bell, personal items and telephone in reach/Instruct patient to call for assistance before getting out of bed or chair/Non-slip footwear when patient is out of bed/[place] to call system/Physically safe environment - no spills, clutter or unnecessary equipment/Purposeful Proactive Rounding/Room/bathroom lighting operational, light cord in reach

## 2022-09-26 NOTE — PROGRESS NOTE ADULT - SUBJECTIVE AND OBJECTIVE BOX
Patient is a 66y old  Male who presents with a chief complaint of Generalized weakness, fever, chills (26 Sep 2022 15:38)    Date of servie : 22 @ 17:14  INTERVAL HPI/OVERNIGHT EVENTS:  T(C): 36.9 (22 @ 10:46), Max: 38.1 (22 @ 18:46)  HR: 88 (22 @ 10:46) (87 - 102)  BP: 127/69 (22 @ 10:46) (105/74 - 152/83)  RR: 17 (22 @ 10:46) (16 - 20)  SpO2: 96% (22 @ 10:46) (96% - 100%)  Wt(kg): --  I&O's Summary    25 Sep 2022 07:01  -  26 Sep 2022 07:00  --------------------------------------------------------  IN: 0 mL / OUT: 2125 mL / NET: -2125 mL    26 Sep 2022 07:01  -  26 Sep 2022 17:14  --------------------------------------------------------  IN: 0 mL / OUT: 600 mL / NET: -600 mL        LABS:                        14.2   15.91 )-----------( 162      ( 26 Sep 2022 05:15 )             44.0         145  |  104  |  22  ----------------------------<  117<H>  4.2   |  25  |  1.24    Ca    10.1      26 Sep 2022 05:15    TPro  7.7  /  Alb  4.6  /  TBili  0.8  /  DBili  x   /  AST  14  /  ALT  14  /  AlkPhos  33<L>      PT/INR - ( 25 Sep 2022 04:37 )   PT: 12.5 sec;   INR: 1.08 ratio         PTT - ( 25 Sep 2022 04:37 )  PTT:25.7 sec  Urinalysis Basic - ( 25 Sep 2022 03:13 )    Color: Light Yellow / Appearance: Clear / S.025 / pH: x  Gluc: x / Ketone: Trace  / Bili: Negative / Urobili: <2 mg/dL   Blood: x / Protein: Trace / Nitrite: Positive   Leuk Esterase: Large / RBC: 1 /HPF / WBC 74 /HPF   Sq Epi: x / Non Sq Epi: 0 /HPF / Bacteria: Many      CAPILLARY BLOOD GLUCOSE      POCT Blood Glucose.: 196 mg/dL (26 Sep 2022 16:56)  POCT Blood Glucose.: 163 mg/dL (26 Sep 2022 11:22)  POCT Blood Glucose.: 101 mg/dL (26 Sep 2022 07:51)  POCT Blood Glucose.: 183 mg/dL (25 Sep 2022 22:33)        Urinalysis Basic - ( 25 Sep 2022 03:13 )    Color: Light Yellow / Appearance: Clear / S.025 / pH: x  Gluc: x / Ketone: Trace  / Bili: Negative / Urobili: <2 mg/dL   Blood: x / Protein: Trace / Nitrite: Positive   Leuk Esterase: Large / RBC: 1 /HPF / WBC 74 /HPF   Sq Epi: x / Non Sq Epi: 0 /HPF / Bacteria: Many        MEDICATIONS  (STANDING):  aspirin enteric coated 81 milliGRAM(s) Oral daily  cefTRIAXone   IVPB 1000 milliGRAM(s) IV Intermittent every 24 hours  clopidogrel Tablet 75 milliGRAM(s) Oral daily  dextrose 5%. 1000 milliLiter(s) (50 mL/Hr) IV Continuous <Continuous>  dextrose 5%. 1000 milliLiter(s) (100 mL/Hr) IV Continuous <Continuous>  dextrose 50% Injectable 25 Gram(s) IV Push once  dextrose 50% Injectable 12.5 Gram(s) IV Push once  dextrose 50% Injectable 25 Gram(s) IV Push once  fenofibrate Tablet 145 milliGRAM(s) Oral at bedtime  finasteride 5 milliGRAM(s) Oral daily  influenza  Vaccine (HIGH DOSE) 0.7 milliLiter(s) IntraMuscular once  insulin lispro (ADMELOG) corrective regimen sliding scale   SubCutaneous three times a day before meals  insulin lispro (ADMELOG) corrective regimen sliding scale   SubCutaneous at bedtime  simvastatin 20 milliGRAM(s) Oral at bedtime  sodium chloride 0.9%. 1000 milliLiter(s) (75 mL/Hr) IV Continuous <Continuous>  tamsulosin 0.4 milliGRAM(s) Oral at bedtime    MEDICATIONS  (PRN):  acetaminophen     Tablet .. 650 milliGRAM(s) Oral every 6 hours PRN Temp greater or equal to 38C (100.4F), Severe Pain (7 - 10)  dextrose Oral Gel 15 Gram(s) Oral once PRN Blood Glucose LESS THAN 70 milliGRAM(s)/deciliter  ondansetron Injectable 4 milliGRAM(s) IV Push every 8 hours PRN Nausea and/or Vomiting          PHYSICAL EXAM:  GENERAL: NAD, well-groomed, well-developed  HEAD:  Atraumatic, Normocephalic  CHEST/LUNG: Clear to percussion bilaterally; No rales, rhonchi, wheezing, or rubs  HEART: Regular rate and rhythm; No murmurs, rubs, or gallops  ABDOMEN: Soft, Nontender, Nondistended; Bowel sounds present  EXTREMITIES:  2+ Peripheral Pulses, No clubbing, cyanosis, or edema  LYMPH: No lymphadenopathy noted  SKIN: No rashes or lesions    Care Discussed with Consultants/Other Providers [ ] YES  [ ] NO

## 2022-09-27 LAB
-  AMIKACIN: SIGNIFICANT CHANGE UP
-  AMOXICILLIN/CLAVULANIC ACID: SIGNIFICANT CHANGE UP
-  AMPICILLIN/SULBACTAM: SIGNIFICANT CHANGE UP
-  AMPICILLIN: SIGNIFICANT CHANGE UP
-  AZTREONAM: SIGNIFICANT CHANGE UP
-  CEFAZOLIN: SIGNIFICANT CHANGE UP
-  CEFEPIME: SIGNIFICANT CHANGE UP
-  CEFOXITIN: SIGNIFICANT CHANGE UP
-  CEFTRIAXONE: SIGNIFICANT CHANGE UP
-  CIPROFLOXACIN: SIGNIFICANT CHANGE UP
-  ERTAPENEM: SIGNIFICANT CHANGE UP
-  GENTAMICIN: SIGNIFICANT CHANGE UP
-  IMIPENEM: SIGNIFICANT CHANGE UP
-  LEVOFLOXACIN: SIGNIFICANT CHANGE UP
-  MEROPENEM: SIGNIFICANT CHANGE UP
-  NITROFURANTOIN: SIGNIFICANT CHANGE UP
-  PIPERACILLIN/TAZOBACTAM: SIGNIFICANT CHANGE UP
-  TIGECYCLINE: SIGNIFICANT CHANGE UP
-  TOBRAMYCIN: SIGNIFICANT CHANGE UP
-  TRIMETHOPRIM/SULFAMETHOXAZOLE: SIGNIFICANT CHANGE UP
ANION GAP SERPL CALC-SCNC: 12 MMOL/L — SIGNIFICANT CHANGE UP (ref 7–14)
BUN SERPL-MCNC: 25 MG/DL — HIGH (ref 7–23)
CALCIUM SERPL-MCNC: 9.8 MG/DL — SIGNIFICANT CHANGE UP (ref 8.4–10.5)
CHLORIDE SERPL-SCNC: 103 MMOL/L — SIGNIFICANT CHANGE UP (ref 98–107)
CO2 SERPL-SCNC: 23 MMOL/L — SIGNIFICANT CHANGE UP (ref 22–31)
CREAT SERPL-MCNC: 1.23 MG/DL — SIGNIFICANT CHANGE UP (ref 0.5–1.3)
CULTURE RESULTS: SIGNIFICANT CHANGE UP
EGFR: 64 ML/MIN/1.73M2 — SIGNIFICANT CHANGE UP
GLUCOSE BLDC GLUCOMTR-MCNC: 154 MG/DL — HIGH (ref 70–99)
GLUCOSE BLDC GLUCOMTR-MCNC: 175 MG/DL — HIGH (ref 70–99)
GLUCOSE BLDC GLUCOMTR-MCNC: 186 MG/DL — HIGH (ref 70–99)
GLUCOSE BLDC GLUCOMTR-MCNC: 280 MG/DL — HIGH (ref 70–99)
GLUCOSE SERPL-MCNC: 242 MG/DL — HIGH (ref 70–99)
HCT VFR BLD CALC: 42.7 % — SIGNIFICANT CHANGE UP (ref 39–50)
HGB BLD-MCNC: 14.1 G/DL — SIGNIFICANT CHANGE UP (ref 13–17)
MAGNESIUM SERPL-MCNC: 1.8 MG/DL — SIGNIFICANT CHANGE UP (ref 1.6–2.6)
MCHC RBC-ENTMCNC: 29.4 PG — SIGNIFICANT CHANGE UP (ref 27–34)
MCHC RBC-ENTMCNC: 33 GM/DL — SIGNIFICANT CHANGE UP (ref 32–36)
MCV RBC AUTO: 89.1 FL — SIGNIFICANT CHANGE UP (ref 80–100)
METHOD TYPE: SIGNIFICANT CHANGE UP
NRBC # BLD: 0 /100 WBCS — SIGNIFICANT CHANGE UP (ref 0–0)
NRBC # FLD: 0 K/UL — SIGNIFICANT CHANGE UP (ref 0–0)
ORGANISM # SPEC MICROSCOPIC CNT: SIGNIFICANT CHANGE UP
ORGANISM # SPEC MICROSCOPIC CNT: SIGNIFICANT CHANGE UP
PHOSPHATE SERPL-MCNC: 2.8 MG/DL — SIGNIFICANT CHANGE UP (ref 2.5–4.5)
PLATELET # BLD AUTO: 154 K/UL — SIGNIFICANT CHANGE UP (ref 150–400)
POTASSIUM SERPL-MCNC: 3.9 MMOL/L — SIGNIFICANT CHANGE UP (ref 3.5–5.3)
POTASSIUM SERPL-SCNC: 3.9 MMOL/L — SIGNIFICANT CHANGE UP (ref 3.5–5.3)
RBC # BLD: 4.79 M/UL — SIGNIFICANT CHANGE UP (ref 4.2–5.8)
RBC # FLD: 13.2 % — SIGNIFICANT CHANGE UP (ref 10.3–14.5)
SODIUM SERPL-SCNC: 138 MMOL/L — SIGNIFICANT CHANGE UP (ref 135–145)
SPECIMEN SOURCE: SIGNIFICANT CHANGE UP
WBC # BLD: 9.24 K/UL — SIGNIFICANT CHANGE UP (ref 3.8–10.5)
WBC # FLD AUTO: 9.24 K/UL — SIGNIFICANT CHANGE UP (ref 3.8–10.5)

## 2022-09-27 PROCEDURE — 76770 US EXAM ABDO BACK WALL COMP: CPT | Mod: 26

## 2022-09-27 PROCEDURE — 99232 SBSQ HOSP IP/OBS MODERATE 35: CPT

## 2022-09-27 RX ORDER — SENNA PLUS 8.6 MG/1
2 TABLET ORAL AT BEDTIME
Refills: 0 | Status: DISCONTINUED | OUTPATIENT
Start: 2022-09-27 | End: 2022-09-29

## 2022-09-27 RX ORDER — POLYETHYLENE GLYCOL 3350 17 G/17G
17 POWDER, FOR SOLUTION ORAL DAILY
Refills: 0 | Status: DISCONTINUED | OUTPATIENT
Start: 2022-09-27 | End: 2022-09-29

## 2022-09-27 RX ADMIN — SENNA PLUS 2 TABLET(S): 8.6 TABLET ORAL at 22:10

## 2022-09-27 RX ADMIN — POLYETHYLENE GLYCOL 3350 17 GRAM(S): 17 POWDER, FOR SOLUTION ORAL at 12:25

## 2022-09-27 RX ADMIN — FINASTERIDE 5 MILLIGRAM(S): 5 TABLET, FILM COATED ORAL at 12:25

## 2022-09-27 RX ADMIN — Medication 1: at 22:11

## 2022-09-27 RX ADMIN — Medication 650 MILLIGRAM(S): at 19:15

## 2022-09-27 RX ADMIN — CLOPIDOGREL BISULFATE 75 MILLIGRAM(S): 75 TABLET, FILM COATED ORAL at 12:25

## 2022-09-27 RX ADMIN — Medication 1: at 07:59

## 2022-09-27 RX ADMIN — TAMSULOSIN HYDROCHLORIDE 0.4 MILLIGRAM(S): 0.4 CAPSULE ORAL at 22:10

## 2022-09-27 RX ADMIN — Medication 145 MILLIGRAM(S): at 22:10

## 2022-09-27 RX ADMIN — CEFTRIAXONE 100 MILLIGRAM(S): 500 INJECTION, POWDER, FOR SOLUTION INTRAMUSCULAR; INTRAVENOUS at 04:07

## 2022-09-27 RX ADMIN — Medication 1 ENEMA: at 19:01

## 2022-09-27 RX ADMIN — Medication 650 MILLIGRAM(S): at 18:15

## 2022-09-27 RX ADMIN — Medication 1: at 17:21

## 2022-09-27 RX ADMIN — SIMVASTATIN 20 MILLIGRAM(S): 20 TABLET, FILM COATED ORAL at 22:10

## 2022-09-27 RX ADMIN — Medication 81 MILLIGRAM(S): at 12:25

## 2022-09-27 NOTE — PROGRESS NOTE ADULT - SUBJECTIVE AND OBJECTIVE BOX
Patient is a 66y old  Male who presents with a chief complaint of Generalized weakness, fever, chills (26 Sep 2022 18:34)    Date of servie : 09-27-22 @ 13:32  INTERVAL HPI/OVERNIGHT EVENTS:  T(C): 37.2 (09-27-22 @ 09:29), Max: 37.9 (09-26-22 @ 21:37)  HR: 89 (09-27-22 @ 09:29) (85 - 98)  BP: 127/68 (09-27-22 @ 09:29) (109/64 - 127/68)  RR: 17 (09-27-22 @ 09:29) (16 - 18)  SpO2: 99% (09-27-22 @ 09:29) (95% - 99%)  Wt(kg): --  I&O's Summary    26 Sep 2022 07:01  -  27 Sep 2022 07:00  --------------------------------------------------------  IN: 0 mL / OUT: 1400 mL / NET: -1400 mL        LABS:                        14.1   9.24  )-----------( 154      ( 27 Sep 2022 10:00 )             42.7     09-27    138  |  103  |  25<H>  ----------------------------<  242<H>  3.9   |  23  |  1.23    Ca    9.8      27 Sep 2022 10:00  Phos  2.8     09-27  Mg     1.80     09-27    TPro  7.7  /  Alb  4.6  /  TBili  0.8  /  DBili  x   /  AST  14  /  ALT  14  /  AlkPhos  33<L>  09-26        CAPILLARY BLOOD GLUCOSE      POCT Blood Glucose.: 175 mg/dL (27 Sep 2022 11:05)  POCT Blood Glucose.: 154 mg/dL (27 Sep 2022 07:50)  POCT Blood Glucose.: 209 mg/dL (26 Sep 2022 21:33)  POCT Blood Glucose.: 196 mg/dL (26 Sep 2022 16:56)            MEDICATIONS  (STANDING):  aspirin enteric coated 81 milliGRAM(s) Oral daily  cefTRIAXone   IVPB 1000 milliGRAM(s) IV Intermittent every 24 hours  clopidogrel Tablet 75 milliGRAM(s) Oral daily  dextrose 5%. 1000 milliLiter(s) (50 mL/Hr) IV Continuous <Continuous>  dextrose 5%. 1000 milliLiter(s) (100 mL/Hr) IV Continuous <Continuous>  dextrose 50% Injectable 25 Gram(s) IV Push once  dextrose 50% Injectable 12.5 Gram(s) IV Push once  dextrose 50% Injectable 25 Gram(s) IV Push once  fenofibrate Tablet 145 milliGRAM(s) Oral at bedtime  finasteride 5 milliGRAM(s) Oral daily  influenza  Vaccine (HIGH DOSE) 0.7 milliLiter(s) IntraMuscular once  insulin lispro (ADMELOG) corrective regimen sliding scale   SubCutaneous three times a day before meals  insulin lispro (ADMELOG) corrective regimen sliding scale   SubCutaneous at bedtime  senna 2 Tablet(s) Oral at bedtime  simvastatin 20 milliGRAM(s) Oral at bedtime  sodium chloride 0.9%. 1000 milliLiter(s) (75 mL/Hr) IV Continuous <Continuous>  tamsulosin 0.4 milliGRAM(s) Oral at bedtime    MEDICATIONS  (PRN):  acetaminophen     Tablet .. 650 milliGRAM(s) Oral every 6 hours PRN Temp greater or equal to 38C (100.4F), Severe Pain (7 - 10)  dextrose Oral Gel 15 Gram(s) Oral once PRN Blood Glucose LESS THAN 70 milliGRAM(s)/deciliter  ondansetron Injectable 4 milliGRAM(s) IV Push every 8 hours PRN Nausea and/or Vomiting  polyethylene glycol 3350 17 Gram(s) Oral daily PRN Constipation          PHYSICAL EXAM:  GENERAL: NAD, well-groomed, well-developed  HEAD:  Atraumatic, Normocephalic  CHEST/LUNG: Clear to percussion bilaterally; No rales, rhonchi, wheezing, or rubs  HEART: Regular rate and rhythm; No murmurs, rubs, or gallops  ABDOMEN: Soft, Nontender, Nondistended; Bowel sounds present  EXTREMITIES:  2+ Peripheral Pulses, No clubbing, cyanosis, or edema  LYMPH: No lymphadenopathy noted  SKIN: No rashes or lesions    Care Discussed with Consultants/Other Providers [ ] YES  [ ] NO

## 2022-09-27 NOTE — PROGRESS NOTE ADULT - SUBJECTIVE AND OBJECTIVE BOX
DATE OF SERVICE: 09-27-22    Patient denies chest pain or shortness of breath.   Review of symptoms otherwise negative.    MEDICATIONS:  acetaminophen     Tablet .. 650 milliGRAM(s) Oral every 6 hours PRN  aspirin enteric coated 81 milliGRAM(s) Oral daily  cefTRIAXone   IVPB 1000 milliGRAM(s) IV Intermittent every 24 hours  clopidogrel Tablet 75 milliGRAM(s) Oral daily  dextrose 5%. 1000 milliLiter(s) IV Continuous <Continuous>  dextrose 5%. 1000 milliLiter(s) IV Continuous <Continuous>  dextrose 50% Injectable 25 Gram(s) IV Push once  dextrose 50% Injectable 12.5 Gram(s) IV Push once  dextrose 50% Injectable 25 Gram(s) IV Push once  dextrose Oral Gel 15 Gram(s) Oral once PRN  fenofibrate Tablet 145 milliGRAM(s) Oral at bedtime  finasteride 5 milliGRAM(s) Oral daily  influenza  Vaccine (HIGH DOSE) 0.7 milliLiter(s) IntraMuscular once  insulin lispro (ADMELOG) corrective regimen sliding scale   SubCutaneous three times a day before meals  insulin lispro (ADMELOG) corrective regimen sliding scale   SubCutaneous at bedtime  ondansetron Injectable 4 milliGRAM(s) IV Push every 8 hours PRN  polyethylene glycol 3350 17 Gram(s) Oral daily PRN  senna 2 Tablet(s) Oral at bedtime  simvastatin 20 milliGRAM(s) Oral at bedtime  sodium chloride 0.9%. 1000 milliLiter(s) IV Continuous <Continuous>  tamsulosin 0.4 milliGRAM(s) Oral at bedtime      LABS:                        14.1   9.24  )-----------( 154      ( 27 Sep 2022 10:00 )             42.7       Hemoglobin: 14.1 g/dL (09-27 @ 10:00)  Hemoglobin: 14.2 g/dL (09-26 @ 05:15)  Hemoglobin: 13.4 g/dL (09-25 @ 20:25)  Hemoglobin: 15.1 g/dL (09-25 @ 03:13)      09-27    138  |  103  |  25<H>  ----------------------------<  242<H>  3.9   |  23  |  1.23    Ca    9.8      27 Sep 2022 10:00  Phos  2.8     09-27  Mg     1.80     09-27    TPro  7.7  /  Alb  4.6  /  TBili  0.8  /  DBili  x   /  AST  14  /  ALT  14  /  AlkPhos  33<L>  09-26    Creatinine Trend: 1.23<--, 1.24<--, 1.22<--    COAGS:     PHYSICAL EXAM:  T(C): 37.2 (09-27-22 @ 19:45), Max: 38.4 (09-27-22 @ 17:12)  HR: 101 (09-27-22 @ 17:12) (89 - 101)  BP: 130/79 (09-27-22 @ 17:12) (119/67 - 130/79)  RR: 18 (09-27-22 @ 17:12) (16 - 18)  SpO2: 95% (09-27-22 @ 17:12) (95% - 99%)  Wt(kg): --    I&O's Summary    26 Sep 2022 07:01  -  27 Sep 2022 07:00  --------------------------------------------------------  IN: 0 mL / OUT: 1400 mL / NET: -1400 mL    27 Sep 2022 07:01  -  27 Sep 2022 20:55  --------------------------------------------------------  IN: 0 mL / OUT: 100 mL / NET: -100 mL    ASSESSMENT/PLAN:  66M PMHx CAD s/p PCI on DAPT (Oct 2021), DM, HTN, HLD, kidney stones with prior hx of 4mm R UVJ stone s/p R ureteral stent (removed 1 month later) with associated urospesis/E coli bacteremia in 2017 who presents with generalized weakness, nausea, chills that started after dinner last night.     -pt. with no chest pain or anginal symptoms  -recommend continuing medical therapy of known CAD - currently on dapt for pci in october of 2021  - c/w statin  -monitor bcx      Ester Brian MD  Pager: [phone]    DATE OF SERVICE: 09-27-22    Patient denies chest pain or shortness of breath.   Review of symptoms otherwise negative.    MEDICATIONS:  acetaminophen     Tablet .. 650 milliGRAM(s) Oral every 6 hours PRN  aspirin enteric coated 81 milliGRAM(s) Oral daily  cefTRIAXone   IVPB 1000 milliGRAM(s) IV Intermittent every 24 hours  clopidogrel Tablet 75 milliGRAM(s) Oral daily  dextrose 5%. 1000 milliLiter(s) IV Continuous <Continuous>  dextrose 5%. 1000 milliLiter(s) IV Continuous <Continuous>  dextrose 50% Injectable 25 Gram(s) IV Push once  dextrose 50% Injectable 12.5 Gram(s) IV Push once  dextrose 50% Injectable 25 Gram(s) IV Push once  dextrose Oral Gel 15 Gram(s) Oral once PRN  fenofibrate Tablet 145 milliGRAM(s) Oral at bedtime  finasteride 5 milliGRAM(s) Oral daily  influenza  Vaccine (HIGH DOSE) 0.7 milliLiter(s) IntraMuscular once  insulin lispro (ADMELOG) corrective regimen sliding scale   SubCutaneous three times a day before meals  insulin lispro (ADMELOG) corrective regimen sliding scale   SubCutaneous at bedtime  ondansetron Injectable 4 milliGRAM(s) IV Push every 8 hours PRN  polyethylene glycol 3350 17 Gram(s) Oral daily PRN  senna 2 Tablet(s) Oral at bedtime  simvastatin 20 milliGRAM(s) Oral at bedtime  sodium chloride 0.9%. 1000 milliLiter(s) IV Continuous <Continuous>  tamsulosin 0.4 milliGRAM(s) Oral at bedtime      LABS:                        14.1   9.24  )-----------( 154      ( 27 Sep 2022 10:00 )             42.7       Hemoglobin: 14.1 g/dL (09-27 @ 10:00)  Hemoglobin: 14.2 g/dL (09-26 @ 05:15)  Hemoglobin: 13.4 g/dL (09-25 @ 20:25)  Hemoglobin: 15.1 g/dL (09-25 @ 03:13)      09-27    138  |  103  |  25<H>  ----------------------------<  242<H>  3.9   |  23  |  1.23    Ca    9.8      27 Sep 2022 10:00  Phos  2.8     09-27  Mg     1.80     09-27    TPro  7.7  /  Alb  4.6  /  TBili  0.8  /  DBili  x   /  AST  14  /  ALT  14  /  AlkPhos  33<L>  09-26    Creatinine Trend: 1.23<--, 1.24<--, 1.22<--    COAGS:     PHYSICAL EXAM:  T(C): 37.2 (09-27-22 @ 19:45), Max: 38.4 (09-27-22 @ 17:12)  HR: 101 (09-27-22 @ 17:12) (89 - 101)  BP: 130/79 (09-27-22 @ 17:12) (119/67 - 130/79)  RR: 18 (09-27-22 @ 17:12) (16 - 18)  SpO2: 95% (09-27-22 @ 17:12) (95% - 99%)  Wt(kg): --    I&O's Summary    26 Sep 2022 07:01  -  27 Sep 2022 07:00  --------------------------------------------------------  IN: 0 mL / OUT: 1400 mL / NET: -1400 mL    27 Sep 2022 07:01  -  27 Sep 2022 20:55  --------------------------------------------------------  IN: 0 mL / OUT: 100 mL / NET: -100 mL    General: Well nourished in no acute distress. Alert and Oriented * 3.   Head: Normocephalic and atraumatic.   Neck: No JVD. No bruits. Supple. Does not appear to be enlarged.   Cardiovascular: + S1,S2 ; RRR Soft systolic murmur at the left lower sternal border. No rubs noted.    Lungs: CTA b/l. No rhonchi, rales or wheezes.   Abdomen: + BS, soft. Non tender. Non distended. No rebound. No guarding.   Extremities: No clubbing/cyanosis/edema.   Neurologic: Moves all four extremities. Full range of motion.   Skin: Warm and moist. The patient's skin has normal elasticity and good skin turgor.   Psychiatric: Appropriate mood and affect.  Musculoskeletal: Normal range of motion, normal strength       ASSESSMENT/PLAN:  66M PMHx CAD s/p PCI on DAPT (Oct 2021), DM, HTN, HLD, kidney stones with prior hx of 4mm R UVJ stone s/p R ureteral stent (removed 1 month later) with associated urospesis/E coli bacteremia in 2017 who presents with generalized weakness, nausea, chills that started after dinner last night.     -pt. with no chest pain or anginal symptoms  -recommend continuing medical therapy of known CAD - currently on dapt for pci in october of 2021  - c/w statin  -monitor bcx      Ester Brian MD  Pager: [phone]

## 2022-09-27 NOTE — PROGRESS NOTE ADULT - SUBJECTIVE AND OBJECTIVE BOX
CC: Patient is a 66y old  Male who presents with a chief complaint of Generalized weakness, fever, chills (27 Sep 2022 13:32)    ID following for UTI    Interval History/ROS: Patient feeling better. No urinary complaints. No fevers, no chills.    Rest of ROS negative.    Allergies  No Known Allergies    ANTIMICROBIALS:  cefTRIAXone   IVPB 1000 every 24 hours    OTHER MEDS:  acetaminophen     Tablet .. 650 milliGRAM(s) Oral every 6 hours PRN  aspirin enteric coated 81 milliGRAM(s) Oral daily  clopidogrel Tablet 75 milliGRAM(s) Oral daily  dextrose 5%. 1000 milliLiter(s) IV Continuous <Continuous>  dextrose 5%. 1000 milliLiter(s) IV Continuous <Continuous>  dextrose 50% Injectable 25 Gram(s) IV Push once  dextrose 50% Injectable 12.5 Gram(s) IV Push once  dextrose 50% Injectable 25 Gram(s) IV Push once  dextrose Oral Gel 15 Gram(s) Oral once PRN  fenofibrate Tablet 145 milliGRAM(s) Oral at bedtime  finasteride 5 milliGRAM(s) Oral daily  influenza  Vaccine (HIGH DOSE) 0.7 milliLiter(s) IntraMuscular once  insulin lispro (ADMELOG) corrective regimen sliding scale   SubCutaneous three times a day before meals  insulin lispro (ADMELOG) corrective regimen sliding scale   SubCutaneous at bedtime  ondansetron Injectable 4 milliGRAM(s) IV Push every 8 hours PRN  polyethylene glycol 3350 17 Gram(s) Oral daily PRN  senna 2 Tablet(s) Oral at bedtime  simvastatin 20 milliGRAM(s) Oral at bedtime  sodium chloride 0.9%. 1000 milliLiter(s) IV Continuous <Continuous>  tamsulosin 0.4 milliGRAM(s) Oral at bedtime    PE:    Vital Signs Last 24 Hrs  T(C): 37.2 (27 Sep 2022 09:29), Max: 37.9 (26 Sep 2022 21:37)  T(F): 98.9 (27 Sep 2022 09:29), Max: 100.3 (26 Sep 2022 21:37)  HR: 89 (27 Sep 2022 09:29) (85 - 98)  BP: 127/68 (27 Sep 2022 09:29) (109/64 - 127/68)  BP(mean): --  RR: 17 (27 Sep 2022 09:29) (16 - 18)  SpO2: 99% (27 Sep 2022 09:29) (95% - 99%)    Parameters below as of 27 Sep 2022 09:29  Patient On (Oxygen Delivery Method): room air    Gen: AOx3, NAD, non-toxic, pleasant  CV: S1+S2 normal, no murmurs  Resp: Clear bilat, no resp distress  Abd: Soft, nontender, +BS  Ext: No LE edema, no wounds  : No Almendarez  IV/Skin: No thrombophlebitis  Neuro: no focal deficits    LABS:                          14.1   9.24  )-----------( 154      ( 27 Sep 2022 10:00 )             42.7       09-27    138  |  103  |  25<H>  ----------------------------<  242<H>  3.9   |  23  |  1.23    Ca    9.8      27 Sep 2022 10:00  Phos  2.8     09-27  Mg     1.80     09-27    TPro  7.7  /  Alb  4.6  /  TBili  0.8  /  DBili  x   /  AST  14  /  ALT  14  /  AlkPhos  33<L>  09-26    MICROBIOLOGY:  v  .Blood Blood-Peripheral  09-25-22   No growth to date.  --  --      .Blood Blood-Peripheral  09-25-22   No growth to date.  --  --      Clean Catch Clean Catch (Midstream)  09-25-22   >100,000 CFU/ml Klebsiella aerogenes (Previously Enterobacter)  --  --    Rapid RVP Result: NotDete (09-25 @ 06:45)    RADIOLOGY:    < from: US Kidney and Bladder (09.27.22 @ 09:19) >  IMPRESSION:  *  Mild bilateral hydronephrosis, which persists post void.  *  Enlarged prostate.  *  Post void residual of 54 mL.  *  Bilateral renal cysts.    < end of copied text >

## 2022-09-27 NOTE — CHART NOTE - NSCHARTNOTEFT_GEN_A_CORE
CT scan of A&P on 9/25 showed no renal stones or hydronephrosis, multiple b/l indeterminate renal hypodensities and recommended nonemergent renal ultrasound for further evaluation.   US from 9/27 showed mild b/l hydronephrosis (persists w/void), b/l renal cysts. Case was discussed with Dr. Brian and urology was consulted.   Case was discussed with urology and was asked to run it by the radiologist to take the look at CT scan and US once again regarding the presence of hydronephrosis. Radiology was called and confirmed that CT scan does not show hydronephrosis but on ultrasound it is understandable that the imaging can be read as mild hydronephrosis.    Pt was examined at bedside. Pt does not report signs of urinary tract infection or difficulty urinating.    Plan:  - Dr. Brian notified  - Reconsult urology in AM

## 2022-09-28 ENCOUNTER — TRANSCRIPTION ENCOUNTER (OUTPATIENT)
Age: 67
End: 2022-09-28

## 2022-09-28 LAB
ANION GAP SERPL CALC-SCNC: 16 MMOL/L — HIGH (ref 7–14)
B PERT DNA SPEC QL NAA+PROBE: SIGNIFICANT CHANGE UP
B PERT+PARAPERT DNA PNL SPEC NAA+PROBE: SIGNIFICANT CHANGE UP
BORDETELLA PARAPERTUSSIS (RAPRVP): SIGNIFICANT CHANGE UP
BUN SERPL-MCNC: 30 MG/DL — HIGH (ref 7–23)
C PNEUM DNA SPEC QL NAA+PROBE: SIGNIFICANT CHANGE UP
CALCIUM SERPL-MCNC: 10.3 MG/DL — SIGNIFICANT CHANGE UP (ref 8.4–10.5)
CHLORIDE SERPL-SCNC: 98 MMOL/L — SIGNIFICANT CHANGE UP (ref 98–107)
CO2 SERPL-SCNC: 22 MMOL/L — SIGNIFICANT CHANGE UP (ref 22–31)
CREAT SERPL-MCNC: 1.26 MG/DL — SIGNIFICANT CHANGE UP (ref 0.5–1.3)
EGFR: 62 ML/MIN/1.73M2 — SIGNIFICANT CHANGE UP
FLUAV SUBTYP SPEC NAA+PROBE: SIGNIFICANT CHANGE UP
FLUBV RNA SPEC QL NAA+PROBE: SIGNIFICANT CHANGE UP
GLUCOSE BLDC GLUCOMTR-MCNC: 156 MG/DL — HIGH (ref 70–99)
GLUCOSE BLDC GLUCOMTR-MCNC: 171 MG/DL — HIGH (ref 70–99)
GLUCOSE BLDC GLUCOMTR-MCNC: 275 MG/DL — HIGH (ref 70–99)
GLUCOSE BLDC GLUCOMTR-MCNC: 296 MG/DL — HIGH (ref 70–99)
GLUCOSE SERPL-MCNC: 205 MG/DL — HIGH (ref 70–99)
HADV DNA SPEC QL NAA+PROBE: SIGNIFICANT CHANGE UP
HCOV 229E RNA SPEC QL NAA+PROBE: SIGNIFICANT CHANGE UP
HCOV HKU1 RNA SPEC QL NAA+PROBE: SIGNIFICANT CHANGE UP
HCOV NL63 RNA SPEC QL NAA+PROBE: SIGNIFICANT CHANGE UP
HCOV OC43 RNA SPEC QL NAA+PROBE: SIGNIFICANT CHANGE UP
HCT VFR BLD CALC: 46 % — SIGNIFICANT CHANGE UP (ref 39–50)
HGB BLD-MCNC: 15.7 G/DL — SIGNIFICANT CHANGE UP (ref 13–17)
HMPV RNA SPEC QL NAA+PROBE: SIGNIFICANT CHANGE UP
HPIV1 RNA SPEC QL NAA+PROBE: SIGNIFICANT CHANGE UP
HPIV2 RNA SPEC QL NAA+PROBE: SIGNIFICANT CHANGE UP
HPIV3 RNA SPEC QL NAA+PROBE: SIGNIFICANT CHANGE UP
HPIV4 RNA SPEC QL NAA+PROBE: SIGNIFICANT CHANGE UP
M PNEUMO DNA SPEC QL NAA+PROBE: SIGNIFICANT CHANGE UP
MAGNESIUM SERPL-MCNC: 1.9 MG/DL — SIGNIFICANT CHANGE UP (ref 1.6–2.6)
MCHC RBC-ENTMCNC: 30.1 PG — SIGNIFICANT CHANGE UP (ref 27–34)
MCHC RBC-ENTMCNC: 34.1 GM/DL — SIGNIFICANT CHANGE UP (ref 32–36)
MCV RBC AUTO: 88.3 FL — SIGNIFICANT CHANGE UP (ref 80–100)
NRBC # BLD: 0 /100 WBCS — SIGNIFICANT CHANGE UP (ref 0–0)
NRBC # FLD: 0 K/UL — SIGNIFICANT CHANGE UP (ref 0–0)
PHOSPHATE SERPL-MCNC: 3.7 MG/DL — SIGNIFICANT CHANGE UP (ref 2.5–4.5)
PLATELET # BLD AUTO: 173 K/UL — SIGNIFICANT CHANGE UP (ref 150–400)
POTASSIUM SERPL-MCNC: 4.1 MMOL/L — SIGNIFICANT CHANGE UP (ref 3.5–5.3)
POTASSIUM SERPL-SCNC: 4.1 MMOL/L — SIGNIFICANT CHANGE UP (ref 3.5–5.3)
RAPID RVP RESULT: SIGNIFICANT CHANGE UP
RBC # BLD: 5.21 M/UL — SIGNIFICANT CHANGE UP (ref 4.2–5.8)
RBC # FLD: 12.7 % — SIGNIFICANT CHANGE UP (ref 10.3–14.5)
RSV RNA SPEC QL NAA+PROBE: SIGNIFICANT CHANGE UP
RV+EV RNA SPEC QL NAA+PROBE: SIGNIFICANT CHANGE UP
SARS-COV-2 RNA SPEC QL NAA+PROBE: SIGNIFICANT CHANGE UP
SODIUM SERPL-SCNC: 136 MMOL/L — SIGNIFICANT CHANGE UP (ref 135–145)
WBC # BLD: 5.43 K/UL — SIGNIFICANT CHANGE UP (ref 3.8–10.5)
WBC # FLD AUTO: 5.43 K/UL — SIGNIFICANT CHANGE UP (ref 3.8–10.5)

## 2022-09-28 PROCEDURE — 99232 SBSQ HOSP IP/OBS MODERATE 35: CPT

## 2022-09-28 RX ORDER — METOPROLOL TARTRATE 50 MG
1 TABLET ORAL
Qty: 0 | Refills: 0 | DISCHARGE

## 2022-09-28 RX ORDER — LOSARTAN POTASSIUM 100 MG/1
1 TABLET, FILM COATED ORAL
Qty: 0 | Refills: 0 | DISCHARGE

## 2022-09-28 RX ORDER — CIPROFLOXACIN LACTATE 400MG/40ML
500 VIAL (ML) INTRAVENOUS EVERY 12 HOURS
Refills: 0 | Status: DISCONTINUED | OUTPATIENT
Start: 2022-09-28 | End: 2022-09-29

## 2022-09-28 RX ORDER — FENOFIBRATE,MICRONIZED 130 MG
1 CAPSULE ORAL
Qty: 30 | Refills: 0
Start: 2022-09-28 | End: 2022-10-27

## 2022-09-28 RX ORDER — NITROGLYCERIN 6.5 MG
1 CAPSULE, EXTENDED RELEASE ORAL
Qty: 0 | Refills: 0 | DISCHARGE

## 2022-09-28 RX ORDER — FENOFIBRATE,MICRONIZED 130 MG
1 CAPSULE ORAL
Qty: 0 | Refills: 0 | DISCHARGE

## 2022-09-28 RX ADMIN — Medication 81 MILLIGRAM(S): at 12:23

## 2022-09-28 RX ADMIN — Medication 145 MILLIGRAM(S): at 21:01

## 2022-09-28 RX ADMIN — SIMVASTATIN 20 MILLIGRAM(S): 20 TABLET, FILM COATED ORAL at 21:01

## 2022-09-28 RX ADMIN — FINASTERIDE 5 MILLIGRAM(S): 5 TABLET, FILM COATED ORAL at 12:23

## 2022-09-28 RX ADMIN — CLOPIDOGREL BISULFATE 75 MILLIGRAM(S): 75 TABLET, FILM COATED ORAL at 12:23

## 2022-09-28 RX ADMIN — Medication 3: at 17:33

## 2022-09-28 RX ADMIN — CEFTRIAXONE 100 MILLIGRAM(S): 500 INJECTION, POWDER, FOR SOLUTION INTRAMUSCULAR; INTRAVENOUS at 03:44

## 2022-09-28 RX ADMIN — TAMSULOSIN HYDROCHLORIDE 0.4 MILLIGRAM(S): 0.4 CAPSULE ORAL at 21:01

## 2022-09-28 RX ADMIN — Medication 1: at 12:00

## 2022-09-28 RX ADMIN — Medication 1: at 22:38

## 2022-09-28 RX ADMIN — Medication 500 MILLIGRAM(S): at 17:32

## 2022-09-28 RX ADMIN — Medication 1: at 07:42

## 2022-09-28 NOTE — DISCHARGE NOTE PROVIDER - NSDCFUADDAPPT_GEN_ALL_CORE_FT
If you need help with your diabetes management, please call the [facility] Endocrinology office listed above for an appointment.

## 2022-09-28 NOTE — DISCHARGE NOTE PROVIDER - HOSPITAL COURSE
66M PMHx CAD s/p PCI on DAPT (Oct 2021), DM, HTN, HLD, kidney stones with prior hx of 4mm R UVJ stone s/p R ureteral stent (removed 1 month later) with associated urospesis/E coli bacteremia in 2017 who presented with generalized weakness, nausea, chills found to have U.T.I.     Hospital Course:   Acute UTI.   - Patient with R flank pain 4-5 day ago that resolved, but now with fever, chills, dysuria/frequency; upon later assessment in afternoon, patient now with hematuria and urinary urgency   - UA grossly positive with pyuria  - likely sec to passed stone  - cw antibiotics as per ID   - Chest CT A/P: No renal stones or hydronephrosis. Multiple b/l indeterminate renal hypodensities. Recommend nonemergent renal ultrasound for further evaluation.  - Mild b/l hydronephrosis (persists w/void), b/l renal cysts   - Appreciate urology eval     Renal stone.   - CT A/P as above     CAD (coronary artery disease).   - s/p PCI in [place]c 2021 on DAPT with ASA and Plavix  - continue ASA and plavix  - hold metoprolol for the moment in setting of infection, possible sepsis   - continue statin  - no CP, EKG no acute ischemic changes.    HTN (hypertension).   - BP stable, low normal  - hold metoprolol and losartan for now in setting of possible sepsis   - restart as tolerated if BP stable.    DM (diabetes mellitus).   - Patient takes metformin and jardiance at home - he states jardiance costs him over $500/90 day supply   - monitor FS AC/HS  - ISS while admitted.    HLD (hyperlipidemia).    - continue statin    On 9/28/22, case discussed with Dr. Brian, pt is medically cleared/stable for discharge. 66M PMHx CAD s/p PCI on DAPT (Oct 2021), DM, HTN, HLD, kidney stones with prior hx of 4mm R UVJ stone s/p R ureteral stent (removed 1 month later) with associated urospesis/E coli bacteremia in 2017 who presented with generalized weakness, nausea, chills found to have U.T.I.     Hospital Course:   Acute UTI.   - Patient with R flank pain 4-5 day ago that resolved, but presented with fever, chills, dysuria/frequency; upon later assessment in afternoon, patient now with hematuria and urinary urgency   - UA grossly positive with pyuria  - likely sec to passed stone  - cw antibiotics as per ID   - Chest CT A/P: No renal stones or hydronephrosis. Multiple b/l indeterminate renal hypodensities. Recommend nonemergent renal ultrasound for further evaluation.  - Mild b/l hydronephrosis (persists w/void), b/l renal cysts   - Appreciate urology eval     Renal stone.   - CT A/P as above     CAD (coronary artery disease).   - s/p PCI in [place] 2021 on DAPT with ASA and Plavix  - continue ASA and plavix  - hold metoprolol for the moment in setting of infection, possible sepsis   - continue statin  - no CP, EKG no acute ischemic changes.    HTN (hypertension).   - BP stable, low normal  - hold metoprolol and losartan for now in setting of possible sepsis   - will continue to hold at discharge. Pt to f/u with PCP    DM (diabetes mellitus).   - Patient takes metformin and jardiance at home - he states jardiance costs him over $500/90 day supply   - monitor FS AC/HS  - ISS while admitted.    HLD (hyperlipidemia).    - continue statin    On 9/29/22, case discussed with Dr. Brian, pt is medically cleared/stable for discharge.

## 2022-09-28 NOTE — CONSULT NOTE ADULT - REASON FOR ADMISSION
Generalized weakness, fever, chills

## 2022-09-28 NOTE — PROGRESS NOTE ADULT - SUBJECTIVE AND OBJECTIVE BOX
Patient is a 66y old  Male who presents with a chief complaint of Generalized weakness, fever, chills (28 Sep 2022 12:39)    Date of servie : 09-28-22 @ 13:32  INTERVAL HPI/OVERNIGHT EVENTS:  T(C): 36.4 (09-28-22 @ 09:46), Max: 38.4 (09-27-22 @ 17:12)  HR: 103 (09-28-22 @ 09:46) (87 - 103)  BP: 100/70 (09-28-22 @ 09:46) (100/70 - 130/79)  RR: 18 (09-28-22 @ 09:46) (17 - 18)  SpO2: 98% (09-28-22 @ 09:46) (95% - 99%)  Wt(kg): --  I&O's Summary    27 Sep 2022 07:01  -  28 Sep 2022 07:00  --------------------------------------------------------  IN: 0 mL / OUT: 100 mL / NET: -100 mL        LABS:                        15.7   5.43  )-----------( 173      ( 28 Sep 2022 10:18 )             46.0     09-28    136  |  98  |  30<H>  ----------------------------<  205<H>  4.1   |  22  |  1.26    Ca    10.3      28 Sep 2022 10:18  Phos  3.7     09-28  Mg     1.90     09-28          CAPILLARY BLOOD GLUCOSE      POCT Blood Glucose.: 156 mg/dL (28 Sep 2022 11:22)  POCT Blood Glucose.: 171 mg/dL (28 Sep 2022 07:34)  POCT Blood Glucose.: 280 mg/dL (27 Sep 2022 21:41)  POCT Blood Glucose.: 186 mg/dL (27 Sep 2022 16:51)            MEDICATIONS  (STANDING):  aspirin enteric coated 81 milliGRAM(s) Oral daily  ciprofloxacin     Tablet 500 milliGRAM(s) Oral every 12 hours  clopidogrel Tablet 75 milliGRAM(s) Oral daily  dextrose 5%. 1000 milliLiter(s) (50 mL/Hr) IV Continuous <Continuous>  dextrose 5%. 1000 milliLiter(s) (100 mL/Hr) IV Continuous <Continuous>  dextrose 50% Injectable 25 Gram(s) IV Push once  dextrose 50% Injectable 12.5 Gram(s) IV Push once  dextrose 50% Injectable 25 Gram(s) IV Push once  fenofibrate Tablet 145 milliGRAM(s) Oral at bedtime  finasteride 5 milliGRAM(s) Oral daily  influenza  Vaccine (HIGH DOSE) 0.7 milliLiter(s) IntraMuscular once  insulin lispro (ADMELOG) corrective regimen sliding scale   SubCutaneous three times a day before meals  insulin lispro (ADMELOG) corrective regimen sliding scale   SubCutaneous at bedtime  senna 2 Tablet(s) Oral at bedtime  simvastatin 20 milliGRAM(s) Oral at bedtime  sodium chloride 0.9%. 1000 milliLiter(s) (75 mL/Hr) IV Continuous <Continuous>  tamsulosin 0.4 milliGRAM(s) Oral at bedtime    MEDICATIONS  (PRN):  acetaminophen     Tablet .. 650 milliGRAM(s) Oral every 6 hours PRN Temp greater or equal to 38C (100.4F), Severe Pain (7 - 10)  dextrose Oral Gel 15 Gram(s) Oral once PRN Blood Glucose LESS THAN 70 milliGRAM(s)/deciliter  ondansetron Injectable 4 milliGRAM(s) IV Push every 8 hours PRN Nausea and/or Vomiting  polyethylene glycol 3350 17 Gram(s) Oral daily PRN Constipation          PHYSICAL EXAM:  GENERAL: NAD, well-groomed, well-developed  HEAD:  Atraumatic, Normocephalic  CHEST/LUNG: Clear to percussion bilaterally; No rales, rhonchi, wheezing, or rubs  HEART: Regular rate and rhythm; No murmurs, rubs, or gallops  ABDOMEN: Soft, Nontender, Nondistended; Bowel sounds present  EXTREMITIES:  2+ Peripheral Pulses, No clubbing, cyanosis, or edema  LYMPH: No lymphadenopathy noted  SKIN: No rashes or lesions    Care Discussed with Consultants/Other Providers [ ] YES  [ ] NO

## 2022-09-28 NOTE — PROGRESS NOTE ADULT - SUBJECTIVE AND OBJECTIVE BOX
CC: Patient is a 66y old  Male who presents with a chief complaint of Generalized weakness, fever, chills (28 Sep 2022 11:38)    ID following for UTI, fever    Interval History/ROS: Patient with fever yesterday, states felt weak, now feeling better. States having loss of voice. No dysuria. No urinary complaints. No cough. No sob. No diarrhea. No abd pain.    Rest of ROS negative.    Allergies  No Known Allergies    ANTIMICROBIALS:  cefTRIAXone   IVPB 1000 every 24 hours    OTHER MEDS:  acetaminophen     Tablet .. 650 milliGRAM(s) Oral every 6 hours PRN  aspirin enteric coated 81 milliGRAM(s) Oral daily  clopidogrel Tablet 75 milliGRAM(s) Oral daily  dextrose 5%. 1000 milliLiter(s) IV Continuous <Continuous>  dextrose 5%. 1000 milliLiter(s) IV Continuous <Continuous>  dextrose 50% Injectable 25 Gram(s) IV Push once  dextrose 50% Injectable 12.5 Gram(s) IV Push once  dextrose 50% Injectable 25 Gram(s) IV Push once  dextrose Oral Gel 15 Gram(s) Oral once PRN  fenofibrate Tablet 145 milliGRAM(s) Oral at bedtime  finasteride 5 milliGRAM(s) Oral daily  influenza  Vaccine (HIGH DOSE) 0.7 milliLiter(s) IntraMuscular once  insulin lispro (ADMELOG) corrective regimen sliding scale   SubCutaneous three times a day before meals  insulin lispro (ADMELOG) corrective regimen sliding scale   SubCutaneous at bedtime  ondansetron Injectable 4 milliGRAM(s) IV Push every 8 hours PRN  polyethylene glycol 3350 17 Gram(s) Oral daily PRN  senna 2 Tablet(s) Oral at bedtime  simvastatin 20 milliGRAM(s) Oral at bedtime  sodium chloride 0.9%. 1000 milliLiter(s) IV Continuous <Continuous>  tamsulosin 0.4 milliGRAM(s) Oral at bedtime    PE:    Vital Signs Last 24 Hrs  T(C): 36.4 (28 Sep 2022 09:46), Max: 38.4 (27 Sep 2022 17:12)  T(F): 97.5 (28 Sep 2022 09:46), Max: 101.1 (27 Sep 2022 17:12)  HR: 103 (28 Sep 2022 09:46) (87 - 103)  BP: 100/70 (28 Sep 2022 09:46) (100/70 - 130/79)  BP(mean): --  RR: 18 (28 Sep 2022 09:46) (17 - 18)  SpO2: 98% (28 Sep 2022 09:46) (95% - 99%)    Parameters below as of 28 Sep 2022 09:46  Patient On (Oxygen Delivery Method): room air    Gen: AOx3, NAD  CV: S1+S2 normal, no murmurs  Resp: Clear bilat, no resp distress  Abd: Soft, nontender, +BS  Ext: No LE edema, no wounds  : No Almendarez  IV/Skin: No thrombophlebitis  Neuro: no focal deficits    LABS:                          15.7   5.43  )-----------( 173      ( 28 Sep 2022 10:18 )             46.0       09-28    136  |  98  |  30<H>  ----------------------------<  205<H>  4.1   |  22  |  1.26    Ca    10.3      28 Sep 2022 10:18  Phos  3.7     09-28  Mg     1.90     09-28    MICROBIOLOGY:  v  .Blood Blood-Peripheral  09-25-22   No growth to date.  --  --      .Blood Blood-Peripheral  09-25-22   No growth to date.  --  --      Clean Catch Clean Catch (Midstream)  09-25-22   >100,000 CFU/ml Klebsiella aerogenes (Previously Enterobacter)  --  Klebsiella aerogenes (Previously Enterobacter)    Rapid RVP Result: NotDetec (09-25 @ 06:45)    RADIOLOGY:    < from: US Kidney and Bladder (09.27.22 @ 09:19) >  IMPRESSION:  *  Mild bilateral hydronephrosis, which persists post void.  *  Enlarged prostate.  *  Post void residual of 54 mL.  *  Bilateral renal cysts.    < end of copied text >

## 2022-09-28 NOTE — DISCHARGE NOTE PROVIDER - NSDCFUSCHEDAPPT_GEN_ALL_CORE_FT
LAZARUS Abbasi  [facility]  NEUROLOGY [address] [facility]  Scheduled Appointment: 10/24/2022

## 2022-09-28 NOTE — DISCHARGE NOTE PROVIDER - CARE PROVIDER_API CALL
Ester Brian)  Internal Medicine  [address] [facility], [place] [address]  Phone: [phone]  Fax: [phone]  Follow Up Time:    Ester Brian)  Internal Medicine  [address] [facility]  [place] [address]  Phone: [phone]  Fax: [phone]  Follow Up Time:     Megan Alberto  [facility] [place]  [address] [facility], [address] [place] [address]  Phone: [phone]  Fax: [phone]  Established Patient  Follow Up Time:    Ester Brian)  Internal Medicine  [address] [facility], [address]  [place] [address]  Phone: [phone]  Fax: [phone]  Follow Up Time:     Megan Alberto  [facility]  [address] [facility], [address] [place] [address]  Phone: [phone]  Fax: [phone]  Established Patient  Follow Up Time:     J Luis Majano  Urology  [address] [facility] [address]  [place] [address]  Phone: [phone]  Fax: [phone])-  Follow Up Time:

## 2022-09-28 NOTE — CONSULT NOTE ADULT - SUBJECTIVE AND OBJECTIVE BOX
HPI:  66 year old male with CAD s/p PCI, DM, HTN, HLD, kidney stones in the past requiring R ureteral stent with E. coli bacteremia in 2017 now presented with generalized weakness, nausea, chills and difficulties in urination. Had R flank pain a few days ago now resolved. Earlier this week had hematuria and inability to urinate, suspect he passed a stone that initially caused bleeding and obstruction. Today feeling better, no problems urinating, no dysuria. Leukocytosis resolved.   CT from 9/25 showed no renal stones or hydronephrosis; multiple bilateral indeterminate renal hypodensities - likely simple cysts  Renal US from 9/27 showed mild bilateral hydronephrosis, which persists post void. Enlarged prostate. Post void residual of 54 mL. Bilateral renal cysts.          PAST MEDICAL & SURGICAL HISTORY:  HTN (hypertension)      DM (diabetes mellitus)      Renal stone  R UVJ stone 2017, E coli bacteremia, s/p ureteral stent placement/removal      CAD (coronary artery disease)  s/p PCI in Oct 2021      HLD (hyperlipidemia)      History of cholecystectomy      History of renal stent  right          MEDICATIONS  (STANDING):  aspirin enteric coated 81 milliGRAM(s) Oral daily  ciprofloxacin     Tablet 500 milliGRAM(s) Oral every 12 hours  clopidogrel Tablet 75 milliGRAM(s) Oral daily  dextrose 5%. 1000 milliLiter(s) (50 mL/Hr) IV Continuous <Continuous>  dextrose 5%. 1000 milliLiter(s) (100 mL/Hr) IV Continuous <Continuous>  dextrose 50% Injectable 25 Gram(s) IV Push once  dextrose 50% Injectable 12.5 Gram(s) IV Push once  dextrose 50% Injectable 25 Gram(s) IV Push once  fenofibrate Tablet 145 milliGRAM(s) Oral at bedtime  finasteride 5 milliGRAM(s) Oral daily  influenza  Vaccine (HIGH DOSE) 0.7 milliLiter(s) IntraMuscular once  insulin lispro (ADMELOG) corrective regimen sliding scale   SubCutaneous three times a day before meals  insulin lispro (ADMELOG) corrective regimen sliding scale   SubCutaneous at bedtime  senna 2 Tablet(s) Oral at bedtime  simvastatin 20 milliGRAM(s) Oral at bedtime  sodium chloride 0.9%. 1000 milliLiter(s) (75 mL/Hr) IV Continuous <Continuous>  tamsulosin 0.4 milliGRAM(s) Oral at bedtime    MEDICATIONS  (PRN):  acetaminophen     Tablet .. 650 milliGRAM(s) Oral every 6 hours PRN Temp greater or equal to 38C (100.4F), Severe Pain (7 - 10)  dextrose Oral Gel 15 Gram(s) Oral once PRN Blood Glucose LESS THAN 70 milliGRAM(s)/deciliter  ondansetron Injectable 4 milliGRAM(s) IV Push every 8 hours PRN Nausea and/or Vomiting  polyethylene glycol 3350 17 Gram(s) Oral daily PRN Constipation      FAMILY HISTORY:  FH: diabetes mellitus        Allergies    No Known Allergies    Intolerances        SOCIAL HISTORY:  · Substance use:	No  · Lives with wife.	Denies smoking, drinking, drugs.      REVIEW OF SYSTEMS: Otherwise negative as stated in HPI      Vital Signs Last 24 Hrs  T(C): 36.4 (28 Sep 2022 09:46), Max: 37.6 (28 Sep 2022 01:40)  T(F): 97.5 (28 Sep 2022 09:46), Max: 99.6 (28 Sep 2022 01:40)  HR: 103 (28 Sep 2022 09:46) (87 - 103)  BP: 100/70 (28 Sep 2022 09:46) (100/70 - 121/58)  BP(mean): --  RR: 18 (28 Sep 2022 09:46) (17 - 18)  SpO2: 98% (28 Sep 2022 09:46) (95% - 98%)    Parameters below as of 28 Sep 2022 09:46  Patient On (Oxygen Delivery Method): room air        PHYSICAL EXAM:    General:[x ] Awake and Alert in no acute distress    Respiratory and Thorax: [ x ] no resp distress   	  Cardiovascular: [x ] Reg Rate    Gastrointestinal: [x ]soft  [x ] non tender,  CVAT [ ]Y  [x ]N                    	    Musculoskeletal / Extremities:  Edema [ ]Y [x ]N,  AROM x 4 [x ]Y  [ ]N  	          LABS:                        15.7   5.43  )-----------( 173      ( 28 Sep 2022 10:18 )             46.0     09-28    136  |  98  |  30<H>  ----------------------------<  205<H>  4.1   |  22  |  1.26    Ca    10.3      28 Sep 2022 10:18  Phos  3.7     09-28  Mg     1.90     09-28          URINE CX:  Klebsiella Aerogenes    BLOOD CX:  No Growth      RADIOLOGY:    < from: CT Abdomen and Pelvis w/ IV Cont (09.25.22 @ 16:57) >    ACC: [id number] EXAM:  CT ABDOMEN AND PELVIS IC                          PROCEDURE DATE:  09/25/2022          INTERPRETATION:  CLINICAL INFORMATION: Right UVJ stone 2017 presenting   with hematuria and UTI.    COMPARISON: CT abdomen pelvis 11/21/2017    CONTRAST/COMPLICATIONS:  IV Contrast: Omnipaque 350  62 cc administered   8 cc discarded  Oral Contrast: NONE  Complications: None reported at time of study completion    PROCEDURE:  CT of the Abdomen and Pelvis was performed.  Sagittal and coronal reformats were performed.    FINDINGS:  LOWER CHEST: Coronary artery calcifications.    LIVER: Diffuse hypoenhancement of the liver. Finding may represent late   arterial phase imaging versus mild hepatic steatosis.  BILE DUCTS: Normal caliber.  GALLBLADDER: Cholecystectomy.  SPLEEN: Within normal limits.  PANCREAS: Within normal limits.  ADRENALS: Unchanged left adrenal myelolipoma. The right adrenal gland is   within normal limits.  KIDNEYS/URETERS: No renal stones or hydronephrosis. Multiple   indeterminate bilateral hypodensities. For reference, there is a left   upper pole 1.3 cm hypodensity, a left mid/lower pole 1.7 cm and the right   mid/lower pole 1.1 cm hypodensity, all of which measure slightly higher   than simple fluid.. Left upper pole 4.3 cm right lower pole 1.8 cm simple   cyst.    BLADDER: Within normal limits.  REPRODUCTIVE ORGANS: Prostatomegaly    BOWEL: No bowel obstruction. Appendix is normal.  PERITONEUM: No ascites.  VESSELS: Atherosclerotic changes.  RETROPERITONEUM/LYMPH NODES: No lymphadenopathy.  ABDOMINAL WALL: Within normal limits.  BONES: Degenerative changes. Bridging osteophytes in the thoracic spine.   Chronic mild T11 wedge deformity.    IMPRESSION:  No renal stones or hydronephrosis.    Multiple bilateral indeterminate renal hypodensities. Recommend   nonemergent renal ultrasound for further evaluation.    --- End of Report ---           MARY MARTINEZ MD; Resident Radiologist  This document has been electronically signed.  TRISHA LUDWIG MD; Attending Radiologist  This document has been electronically signed. Sep 25 2022  8:02PM          < from: US Kidney and Bladder (09.27.22 @ 09:19) >    ACC: [id number] EXAM:  US KIDNEYS AND BLADDER                          PROCEDURE DATE:  09/27/2022          INTERPRETATION:  CLINICAL INFORMATION: Kidney stone.    COMPARISON: CT scan 9/25/2022    TECHNIQUE: Sonography of the kidneys and bladder.    FINDINGS:  Right kidney: 11.1 cm. Mild hydronephrosis.  *  Right lower pole renal cyst measures 1.9 x 1.4 x 1.6 cm.  *  Additional lower pole cyst measures 1.3 x 0.9 x 1.1 cm.    Left kidney: 10.9 cm. Mild hydronephrosis.  *  Upper pole cyst measures 3.8 x 4.4 x 4.1 cm.  *  Lower pole cyst measures 1.7 x 1.5 x 1.3 cm.    Urinary bladder: Bladder wall within normal limits. Enlarged prostate   (calculated volume = 77 mL) prevoid bladder volume of 195 mL. Postvoid   residual of 54 mL.    IMPRESSION:  *  Mild bilateral hydronephrosis, which persists post void.  *  Enlarged prostate.  *  Post void residual of 54 mL.  *  Bilateral renal cysts.        --- End of Report ---            TAMIA CONTE MD; Attending Radiologist  This document has been electronically signed. Sep 27 2022  9:30AM  
Date of service: 09/26/22    Requesting Physician : Dr. Brian     Reason for Consultation: CAD    HISTORY OF PRESENT ILLNESS: HPI:  66M PMHx CAD s/p PCI on DAPT (Oct 2021), DM, HTN, HLD, kidney stones with prior hx of 4mm R UVJ stone s/p R ureteral stent (removed 1 month later) with associated urospesis/E coli bacteremia in 2017 who presents with generalized weakness, nausea, chills that started after dinner last night.  The patient was walking to the bathroom when all of a sudden he developed chills.  He had no syncope or near syncope.  He denies chest pain or anginal symptoms.      PAST MEDICAL & SURGICAL HISTORY:  HTN (hypertension)      DM (diabetes mellitus)      Renal stone  R UVJ stone 2017, E coli bacteremia, s/p ureteral stent placement/removal      CAD (coronary artery disease)  s/p PCI in Oct 2021      HLD (hyperlipidemia)      History of cholecystectomy      History of renal stent  right              MEDICATIONS:  MEDICATIONS  (STANDING):  aspirin enteric coated 81 milliGRAM(s) Oral daily  cefTRIAXone   IVPB 1000 milliGRAM(s) IV Intermittent every 24 hours  clopidogrel Tablet 75 milliGRAM(s) Oral daily  dextrose 5%. 1000 milliLiter(s) (50 mL/Hr) IV Continuous <Continuous>  dextrose 5%. 1000 milliLiter(s) (100 mL/Hr) IV Continuous <Continuous>  dextrose 50% Injectable 25 Gram(s) IV Push once  dextrose 50% Injectable 12.5 Gram(s) IV Push once  dextrose 50% Injectable 25 Gram(s) IV Push once  fenofibrate Tablet 145 milliGRAM(s) Oral at bedtime  finasteride 5 milliGRAM(s) Oral daily  influenza  Vaccine (HIGH DOSE) 0.7 milliLiter(s) IntraMuscular once  insulin lispro (ADMELOG) corrective regimen sliding scale   SubCutaneous three times a day before meals  insulin lispro (ADMELOG) corrective regimen sliding scale   SubCutaneous at bedtime  simvastatin 20 milliGRAM(s) Oral at bedtime  sodium chloride 0.9%. 1000 milliLiter(s) (75 mL/Hr) IV Continuous <Continuous>  tamsulosin 0.4 milliGRAM(s) Oral at bedtime      Allergies    No Known Allergies    Intolerances        FAMILY HISTORY:  FH: diabetes mellitus      Non-contributary for premature coronary disease or sudden cardiac death    SOCIAL HISTORY:    [x ] Non-smoker  [ ] Smoker  [ ] Alcohol      REVIEW OF SYSTEMS:  [ ]chest pain  [  ]shortness of breath  [  ]palpitations  [  ]syncope  [ ]near syncope [ ]upper extremity weakness   [ ] lower extremity weakness  [  ]diplopia  [  ]altered mental status   [  ]fevers  [ ]chills [ ]nausea  [ ]vomitting  [  ]dysphagia    [ ]abdominal pain  [ ]melena  [ ]BRBPR    [  ]epistaxis  [  ]rash    [ ]lower extremity edema        [x ] All others negative	  [ ] Unable to obtain    PHYSICAL EXAM:  T(C): 36.8 (09-26-22 @ 18:03), Max: 38.1 (09-25-22 @ 18:46)  HR: 85 (09-26-22 @ 18:03) (85 - 102)  BP: 109/64 (09-26-22 @ 18:03) (105/74 - 152/83)  RR: 18 (09-26-22 @ 18:03) (16 - 20)  SpO2: 98% (09-26-22 @ 18:03) (96% - 100%)  Wt(kg): --  I&O's Summary    25 Sep 2022 07:01  -  26 Sep 2022 07:00  --------------------------------------------------------  IN: 0 mL / OUT: 2125 mL / NET: -2125 mL    26 Sep 2022 07:01  -  26 Sep 2022 18:34  --------------------------------------------------------  IN: 0 mL / OUT: 600 mL / NET: -600 mL          HEENT:   Normal oral mucosa, PERRL, EOMI	  Lymphatic: No lymphadenopathy , no edema  Cardiovascular: Normal S1 S2, No JVD, No murmurs , Peripheral pulses palpable 2+ bilaterally  Respiratory: Lungs clear to auscultation, normal effort 	  Gastrointestinal:  Soft, Non-tender, + BS	  Skin: No rashes, No ecchymoses, No cyanosis, warm to touch  Musculoskeletal: Normal range of motion, normal strength  Psychiatry:  Mood & affect appropriate      TELEMETRY: 	    ECG:  	  RADIOLOGY:  OTHER:     DIAGNOSTIC TESTING:  [ ] Echocardiogram:  [ ]  Catheterization:  [ ] Stress Test:    	  	  LABS:	 	    CARDIAC MARKERS:                              14.2   15.91 )-----------( 162      ( 26 Sep 2022 05:15 )             44.0     09-26    145  |  104  |  22  ----------------------------<  117<H>  4.2   |  25  |  1.24    Ca    10.1      26 Sep 2022 05:15    TPro  7.7  /  Alb  4.6  /  TBili  0.8  /  DBili  x   /  AST  14  /  ALT  14  /  AlkPhos  33<L>  09-26    proBNP:   Lipid Profile:   HgA1c:   TSH:     ASSESSMENT/PLAN:  66M PMHx CAD s/p PCI on DAPT (Oct 2021), DM, HTN, HLD, kidney stones with prior hx of 4mm R UVJ stone s/p R ureteral stent (removed 1 month later) with associated urospesis/E coli bacteremia in 2017 who presents with generalized weakness, nausea, chills that started after dinner last night.     -pt. with no chest pain or anginal symptoms  -recommend continuing medical therapy of known CAD - currently on dapt for pci in october of 2021  -check 12 lead ecg  -monitor bcx  -further workup pending clinical course    Tarah Meyer MD     
HPI:  66M PMHx CAD s/p PCI on DAPT (Oct 2021), DM, HTN, HLD, kidney stones with prior hx of 4mm R UVJ stone s/p R ureteral stent (removed 1 month later) with associated urospesis/E coli bacteremia in  who presents with generalized weakness, nausea, chills that started after dinner last night. Patient was in his usual state of health yesterday during the day. He ate dinner and started to feel nausea and threw up once. He went to take a bath to see if he felt better and started to have shaking chills. Wife took his temp and told him he had a fever but was unsure how high. He took a nap afterward without improvement, so he decided to come to ED to get checked out. He endorses intermittent R flank discomfort 4-5 days ago that is now resolved. He admits associated dysuria and urinary frequency that started last night. During interview, patient had several sips of coffee and immediately threw it up. He tolerated breakfast earlier without issue. He otherwise denies CP, SOB, abdominal pain, flank pain, diarrhea, constipation, focal weakness, headache, changes in hearing or vision, trouble ambulating. In the ED he was found to have U.T.I and started on CTX and given 1.5L NS and tylenol. CXR showed clear lungs.      Blood cultures negative.  States yesterday had difficulties urinating with hematuria, which has now resolved today.  Febrile to 100.5F.    Leukocytosis improving.  UA with pyuria. LE large, nitrite positive, blood trace.    CT A/P with no renal stones or hydronephrosis. Multiple bilateral indeterminate renal hypodensities. Recommend nonemergent renal ultrasound for further evaluation.    PAST MEDICAL & SURGICAL HISTORY:  HTN (hypertension)    DM (diabetes mellitus)    Renal stone  R UVJ stone 2017, E coli bacteremia, s/p ureteral stent placement/removal    CAD (coronary artery disease)  s/p PCI in Oct 2021    HLD (hyperlipidemia)    History of cholecystectomy    History of renal stent  right    Allergies    No Known Allergies    Intolerances    ANTIMICROBIALS:  cefTRIAXone   IVPB 1000 every 24 hours    OTHER MEDS:  acetaminophen     Tablet .. 650 milliGRAM(s) Oral every 6 hours PRN  aspirin enteric coated 81 milliGRAM(s) Oral daily  clopidogrel Tablet 75 milliGRAM(s) Oral daily  dextrose 5%. 1000 milliLiter(s) IV Continuous <Continuous>  dextrose 5%. 1000 milliLiter(s) IV Continuous <Continuous>  dextrose 50% Injectable 25 Gram(s) IV Push once  dextrose 50% Injectable 12.5 Gram(s) IV Push once  dextrose 50% Injectable 25 Gram(s) IV Push once  dextrose Oral Gel 15 Gram(s) Oral once PRN  fenofibrate Tablet 145 milliGRAM(s) Oral at bedtime  finasteride 5 milliGRAM(s) Oral daily  influenza  Vaccine (HIGH DOSE) 0.7 milliLiter(s) IntraMuscular once  insulin lispro (ADMELOG) corrective regimen sliding scale   SubCutaneous three times a day before meals  insulin lispro (ADMELOG) corrective regimen sliding scale   SubCutaneous at bedtime  ondansetron Injectable 4 milliGRAM(s) IV Push every 8 hours PRN  simvastatin 20 milliGRAM(s) Oral at bedtime  sodium chloride 0.9%. 1000 milliLiter(s) IV Continuous <Continuous>  tamsulosin 0.4 milliGRAM(s) Oral at bedtime    SOCIAL HISTORY: Denies smoking, alcohol, drug use.    FAMILY HISTORY:  FH: diabetes mellitus    Drug Dosing Weight  Height (cm): 167.6 (25 Sep 2022 01:42)    PE:    Vital Signs Last 24 Hrs  T(C): 36.9 (26 Sep 2022 10:46), Max: 38.1 (25 Sep 2022 18:46)  T(F): 98.5 (26 Sep 2022 10:46), Max: 100.5 (25 Sep 2022 18:46)  HR: 88 (26 Sep 2022 10:46) (87 - 102)  BP: 127/69 (26 Sep 2022 10:46) (105/74 - 152/83)  BP(mean): --  RR: 17 (26 Sep 2022 10:46) (16 - 20)  SpO2: 96% (26 Sep 2022 10:46) (96% - 100%)    Parameters below as of 26 Sep 2022 10:46  Patient On (Oxygen Delivery Method): room air    Gen: AOx3, NAD, non-toxic, pleasant  CV: S1+S2 normal, no murmurs, nontachycardic  Resp: Clear bilat, no resp distress, no crackles/wheezes  Abd: Soft, nontender, +BS  Ext: No LE edema, no wounds  : No Almendarez, no cva tenderness  IV/Skin: No thrombophlebitis  Msk: No low back pain, no arthralgias, no joint swelling  Neuro: No sensory deficits, no motor deficits    LABS:                          14.2   15.91 )-----------( 162      ( 26 Sep 2022 05:15 )             44.0           145  |  104  |  22  ----------------------------<  117<H>  4.2   |  25  |  1.24    Ca    10.1      26 Sep 2022 05:15    TPro  7.7  /  Alb  4.6  /  TBili  0.8  /  DBili  x   /  AST  14  /  ALT  14  /  AlkPhos  33<L>        Urinalysis Basic - ( 25 Sep 2022 03:13 )    Color: Light Yellow / Appearance: Clear / S.025 / pH: x  Gluc: x / Ketone: Trace  / Bili: Negative / Urobili: <2 mg/dL   Blood: x / Protein: Trace / Nitrite: Positive   Leuk Esterase: Large / RBC: 1 /HPF / WBC 74 /HPF   Sq Epi: x / Non Sq Epi: 0 /HPF / Bacteria: Many    MICROBIOLOGY:  v  .Blood Blood-Peripheral  22   No growth to date.  --  --      .Blood Blood-Peripheral  22   No growth to date.  --  --    Rapid RVP Result: NotDetec ( @ 06:45)    RADIOLOGY:    < from: CT Abdomen and Pelvis w/ IV Cont (22 @ 16:57) >  IMPRESSION:  No renal stones or hydronephrosis.    Multiple bilateral indeterminate renal hypodensities. Recommend   nonemergent renal ultrasound for further evaluation.    < end of copied text >    < from: Xray Chest 1 View- PORTABLE-Urgent (Xray Chest 1 View- PORTABLE-Urgent .) (22 @ 03:42) >  IMPRESSION:  Clear lungs.      < end of copied text >

## 2022-09-28 NOTE — DISCHARGE NOTE PROVIDER - NSDCMRMEDTOKEN_GEN_ALL_CORE_FT
Aspirin Enteric Coated 81 mg oral delayed release tablet: 1 tab(s) orally once a day  fenofibrate 160 mg oral tablet: 1 tab(s) orally once a day  finasteride 5 mg oral tablet: 1 tab(s) orally once a day  Jardiance 25 mg oral tablet: 1 tab(s) orally once a day (in the morning)  losartan 25 mg oral tablet: 1 tab(s) orally once a day  metFORMIN 1000 mg oral tablet: 1 tab(s) orally once a day  Metoprolol Succinate ER 25 mg oral tablet, extended release: 1 tab(s) orally once a day  nitroglycerin 0.4 mg sublingual tablet: 1 tab as needed   Plavix 75 mg oral tablet: 1 tab(s) orally once a day  simvastatin 20 mg oral tablet: 1 tab(s) orally once a day (at bedtime)  tamsulosin 0.4 mg oral capsule: 1 cap(s) orally once a day (at bedtime)   Aspirin Enteric Coated 81 mg oral delayed release tablet: 1 tab(s) orally once a day  ciprofloxacin 500 mg oral tablet: 1 tab(s) orally every 12 hours  finasteride 5 mg oral tablet: 1 tab(s) orally once a day  Jardiance 25 mg oral tablet: 1 tab(s) orally once a day (in the morning)  metFORMIN 1000 mg oral tablet: 1 tab(s) orally once a day  Plavix 75 mg oral tablet: 1 tab(s) orally once a day  simvastatin 20 mg oral tablet: 1 tab(s) orally once a day (at bedtime)  tamsulosin 0.4 mg oral capsule: 1 cap(s) orally once a day (at bedtime)

## 2022-09-28 NOTE — DISCHARGE NOTE PROVIDER - NSFOLLOWUPCLINICS_GEN_ALL_ED_FT
[facility]  Urology  [address] [place] [address] - [address] [place] [address]  Phone: [phone]  Fax:      [facility]  Urology  [address] [place] [address] - [address]rd [place] [address]  Phone: [phone]  Fax:     [facility]  Endocrinology  [address] [place] [id number]  Phone: [phone]  Fax:

## 2022-09-28 NOTE — PROGRESS NOTE ADULT - SUBJECTIVE AND OBJECTIVE BOX
DATE OF SERVICE: 09-28-22    Patient denies chest pain or shortness of breath.   Review of symptoms otherwise negative.    MEDICATIONS:  acetaminophen     Tablet .. 650 milliGRAM(s) Oral every 6 hours PRN  aspirin enteric coated 81 milliGRAM(s) Oral daily  cefTRIAXone   IVPB 1000 milliGRAM(s) IV Intermittent every 24 hours  clopidogrel Tablet 75 milliGRAM(s) Oral daily  dextrose 5%. 1000 milliLiter(s) IV Continuous <Continuous>  dextrose 5%. 1000 milliLiter(s) IV Continuous <Continuous>  dextrose 50% Injectable 25 Gram(s) IV Push once  dextrose 50% Injectable 12.5 Gram(s) IV Push once  dextrose 50% Injectable 25 Gram(s) IV Push once  dextrose Oral Gel 15 Gram(s) Oral once PRN  fenofibrate Tablet 145 milliGRAM(s) Oral at bedtime  finasteride 5 milliGRAM(s) Oral daily  influenza  Vaccine (HIGH DOSE) 0.7 milliLiter(s) IntraMuscular once  insulin lispro (ADMELOG) corrective regimen sliding scale   SubCutaneous three times a day before meals  insulin lispro (ADMELOG) corrective regimen sliding scale   SubCutaneous at bedtime  ondansetron Injectable 4 milliGRAM(s) IV Push every 8 hours PRN  polyethylene glycol 3350 17 Gram(s) Oral daily PRN  senna 2 Tablet(s) Oral at bedtime  simvastatin 20 milliGRAM(s) Oral at bedtime  sodium chloride 0.9%. 1000 milliLiter(s) IV Continuous <Continuous>  tamsulosin 0.4 milliGRAM(s) Oral at bedtime      LABS:                        15.7   5.43  )-----------( 173      ( 28 Sep 2022 10:18 )             46.0       Hemoglobin: 15.7 g/dL (09-28 @ 10:18)  Hemoglobin: 14.1 g/dL (09-27 @ 10:00)  Hemoglobin: 14.2 g/dL (09-26 @ 05:15)  Hemoglobin: 13.4 g/dL (09-25 @ 20:25)  Hemoglobin: 15.1 g/dL (09-25 @ 03:13)      09-28    136  |  98  |  30<H>  ----------------------------<  205<H>  4.1   |  22  |  1.26    Ca    10.3      28 Sep 2022 10:18  Phos  3.7     09-28  Mg     1.90     09-28      Creatinine Trend: 1.26<--, 1.23<--, 1.24<--, 1.22<--    COAGS:           PHYSICAL EXAM:  T(C): 36.4 (09-28-22 @ 09:46), Max: 38.4 (09-27-22 @ 17:12)  HR: 103 (09-28-22 @ 09:46) (87 - 103)  BP: 100/70 (09-28-22 @ 09:46) (100/70 - 130/79)  RR: 18 (09-28-22 @ 09:46) (17 - 18)  SpO2: 98% (09-28-22 @ 09:46) (95% - 99%)  Wt(kg): --    I&O's Summary    27 Sep 2022 07:01  -  28 Sep 2022 07:00  --------------------------------------------------------  IN: 0 mL / OUT: 100 mL / NET: -100 mL    General: Well nourished in no acute distress. Alert and Oriented * 3.   Head: Normocephalic and atraumatic.   Neck: No JVD. No bruits. Supple. Does not appear to be enlarged.   Cardiovascular: + S1,S2 ; RRR Soft systolic murmur at the left lower sternal border. No rubs noted.    Lungs: CTA b/l. No rhonchi, rales or wheezes.   Abdomen: + BS, soft. Non tender. Non distended. No rebound. No guarding.   Extremities: No clubbing/cyanosis/edema.   Neurologic: Moves all four extremities. Full range of motion.   Skin: Warm and moist. The patient's skin has normal elasticity and good skin turgor.   Psychiatric: Appropriate mood and affect.  Musculoskeletal: Normal range of motion, normal strength     ASSESSMENT/PLAN:  66M PMHx CAD s/p PCI on DAPT (Oct 2021), DM, HTN, HLD, kidney stones with prior hx of 4mm R UVJ stone s/p R ureteral stent (removed 1 month later) with associated urospesis/E coli bacteremia in 2017 who presents with generalized weakness, nausea, chills that started after dinner last night.     - pt. with no chest pain or anginal symptoms  - recommend continuing medical therapy of known CAD - currently on dapt for pci in october of 2021  - c/w statin  - monitor bcx      Ester Brian MD  Pager: [phone]

## 2022-09-28 NOTE — DISCHARGE NOTE PROVIDER - NSDCCPCAREPLAN_GEN_ALL_CORE_FT
PRINCIPAL DISCHARGE DIAGNOSIS  Diagnosis: Acute UTI  Assessment and Plan of Treatment: You presented to the hospital with flank pain, fever, and urinary tract infection symptoms. You were seen by the infectious disease doctor. Your flank pain has now resolved, white blood cell count is normal, and you have been free of fevere. CT scan of the abdomen showed no renal stones of hydronephrosis. US was performed which showed renal cysts and enlarged prostate. Please follow up with outpatient urology for further evalaution and monitoring at this time. You did not report any difficulty urinating or having to strain to void. Continue the medications as provided to finish the antibiotic course.      SECONDARY DISCHARGE DIAGNOSES  Diagnosis: Renal stone  Assessment and Plan of Treatment: You had a history of renal stone but repeat CT scan did not see stone (may have passed). Continue to follow up with your outpatient urologist for further management.    Diagnosis: HTN (hypertension)  Assessment and Plan of Treatment: Your blood pressure medications (losartan and metoprolol) were held in the setting of infection. Your blood pressure and heart rate was monitored during your stay. Your blood pressure remained soft althought your heart rate was in the upper limit 80s-100s. You were seen by the cardiologist in the hospital who recommended that you continue to hold these medications until outpatient follow up can be made. Please make appointment and follow up with your outpatient cardiologist regarding restarting of the medication and possible dose adjustment.     PRINCIPAL DISCHARGE DIAGNOSIS  Diagnosis: Acute UTI  Assessment and Plan of Treatment: You presented to the hospital with flank pain, fever, and urinary tract infection symptoms. You were seen by the infectious disease doctor. Your flank pain has now resolved, white blood cell count is normal, and you have been free of fevere. CT scan of the abdomen showed no renal stones of hydronephrosis. US was performed which showed renal cysts and enlarged prostate. Please follow up with outpatient urology and primary care doctor for further evalaution and monitoring. You did not report any difficulty urinating or having to strain to void. Continue Ciprofloxacin for five more days.      SECONDARY DISCHARGE DIAGNOSES  Diagnosis: Renal stone  Assessment and Plan of Treatment: You had a history of renal stones but repeat CT scan did not see stone (may have passed). Continue to follow up with your outpatient urologist for further management.    Diagnosis: HTN (hypertension)  Assessment and Plan of Treatment: Your blood pressure medications (losartan and metoprolol) were held in the setting of infection. Your blood pressure and heart rate was monitored during your stay. Your blood pressure remained soft althought your heart rate was in the upper limit 80s-100s. You were seen by the cardiologist in the hospital who recommended that you continue to hold these medications until outpatient follow up can be made. Please make appointment and follow up with your outpatient cardiologist  and primary care doctor regarding restarting of the medication and possible dose adjustment.     PRINCIPAL DISCHARGE DIAGNOSIS  Diagnosis: Acute UTI  Assessment and Plan of Treatment: You presented to the hospital with flank pain, fever, and urinary tract infection symptoms. You were seen by the infectious disease doctor. Your flank pain has now resolved, white blood cell count is normal, and you have been free of fevere. CT scan of the abdomen showed no renal stones of hydronephrosis. US was performed which showed renal cysts and enlarged prostate. Please follow up with outpatient urology and primary care doctor for further evalaution and monitoring. You did not report any difficulty urinating or having to strain to void. Continue Ciprofloxacin for five more days.      SECONDARY DISCHARGE DIAGNOSES  Diagnosis: Renal stone  Assessment and Plan of Treatment: You had a history of renal stones but repeat CT scan did not see stone (may have passed). Continue to follow up with your outpatient urologist Dr. Majano for further management.    Diagnosis: HTN (hypertension)  Assessment and Plan of Treatment: Your blood pressure medications (losartan and metoprolol) were held in the setting of infection. Your blood pressure and heart rate was monitored during your stay. Your blood pressure remained soft althought your heart rate was in the upper limit 80s-100s. You were seen by the cardiologist in the hospital who recommended that you continue to hold these medications until outpatient follow up can be made. Please make appointment and follow up with your outpatient cardiologist  and primary care doctor regarding restarting of the medication and possible dose adjustment.

## 2022-09-28 NOTE — CHART NOTE - NSCHARTNOTEFT_GEN_A_CORE
CT scan of A&P on 9/25 showed no renal stones or hydronephrosis, multiple b/l indeterminate renal hypodensities and recommended nonemergent renal ultrasound for further evaluation.   US from 9/27 showed mild b/l hydronephrosis (persists w/void), b/l renal cysts. Case was discussed with Dr. Brian and urology was consulted.     Case was discussed with urology and was asked to run it by the radiologist to take the look at CT scan and US once again regarding the presence of hydronephrosis. Radiology was called and confirmed that CT scan does not show hydronephrosis but on ultrasound it is understandable that the imaging can be read as mild hydronephrosis.     Pt was examined at bedside. Pt does not report signs of urinary tract infection or difficulty urinating.    Plan:  - Dr. Brian notified  - Urology was reconsulted - f/u with further recommendation CT scan of A&P on 9/25 showed no renal stones or hydronephrosis, multiple b/l indeterminate renal hypodensities and recommended nonemergent renal ultrasound for further evaluation.   US from 9/27 showed mild b/l hydronephrosis (persists w/void), b/l renal cysts. Case was discussed with Dr. Brian and urology was consulted.     Case was discussed with urology and was asked to run it by the radiologist to take the look at CT scan and US once again regarding the presence of hydronephrosis. Radiology was called and confirmed that CT scan does not show hydronephrosis but on ultrasound it is understandable that the imaging can be read as mild hydronephrosis. Attending who read the CT scan was notified through email for CT scan and US to be confirmed.     Pt was examined at bedside. Pt does not report signs of urinary tract infection or difficulty urinating.    Plan:  - Dr. Brian notified  - Urology was reconsulted - f/u with further recommendation CT scan of A&P on 9/25 showed no renal stones or hydronephrosis, multiple b/l indeterminate renal hypodensities and recommended nonemergent renal ultrasound for further evaluation.   US from 9/27 showed mild b/l hydronephrosis (persists w/void), b/l renal cysts. Case was discussed with Dr. Brian and urology was consulted.     Case was discussed with urology and was asked to run it by the radiologist to take the look at CT scan and US once again regarding the presence of hydronephrosis. Radiology was called and confirmed that CT scan does not show hydronephrosis but on ultrasound it is understandable that the imaging can be read as mild hydronephrosis. Notified radiologist and requested CT scan and US to be compared for indication of hydronephrosis.     Pt was examined at bedside. Pt does not report signs of urinary tract infection or difficulty urinating.    Plan:  - Dr. Brian notified  - Urology was consulted in AM - f/u with further recommendation

## 2022-09-28 NOTE — CONSULT NOTE ADULT - ASSESSMENT
66y Male with UTI and inconsistent findings on CT scan and Renal US
66 year old male with CAD s/p PCI, DM, HTN, HLD, kidney stones in the past requiring R ureteral stent with E. coli bacteremia in 2017 now presenting with generalized weakness, nausea, chills and difficulties in urination. Had R flank pain a few days ago now resolved. Yesterday had hematuria and inability to urinate, suspect he passed a stone that initially caused bleeding and obstruction. Today feeling better, no problems urinating, no dysuria. Leukocytosis improving. Fever curve better.    Blood cultures negative, urine culture pending.    Recommend:  #Sepsis (fever, leukocytosis) secondary to UTI, suspect had urinary obstruction from kidney stone which he may have passed  -Continue ceftriaxone  -F/U urine culture  -Blood cxs negative to date  -CT without stones    #Renal hypodensities on CT  -Check renal US    Siva Trevino MD  Available through MS Teams  If no response, or after 5pm/weekends, call [phone]

## 2022-09-28 NOTE — DISCHARGE NOTE PROVIDER - PROVIDER TOKENS
PROVIDER:[TOKEN:[[id number]]] PROVIDER:[TOKEN:[[id number]]],PROVIDER:[TOKEN:[[id number]],ESTABLISHEDPATIENT:[T]] PROVIDER:[TOKEN:[[id number]]],PROVIDER:[TOKEN:[[id number]],ESTABLISHEDPATIENT:[T]],PROVIDER:[TOKEN:[[id number]]]

## 2022-09-28 NOTE — DISCHARGE NOTE PROVIDER - CARE PROVIDERS DIRECT ADDRESSES
,DirectAddress_Unknown ,DirectAddress_Unknown,DirectAddress_Unknown ,DirectAddress_Unknown,DirectAddress_Unknown,[email or web address]

## 2022-09-28 NOTE — CONSULT NOTE ADULT - PROBLEM SELECTOR RECOMMENDATION 9
Continue IV abx as per ID  No acute Urologic intervention.  No concern for acute obstruction of kidneys.  Can do repeat Renal US pre discharge or as an outpatient.  Pt can follow up with his Urologist Dr. Majano as outpatient   D/W Dr. Eden

## 2022-09-29 ENCOUNTER — TRANSCRIPTION ENCOUNTER (OUTPATIENT)
Age: 67
End: 2022-09-29

## 2022-09-29 VITALS
DIASTOLIC BLOOD PRESSURE: 86 MMHG | TEMPERATURE: 99 F | RESPIRATION RATE: 16 BRPM | SYSTOLIC BLOOD PRESSURE: 132 MMHG | OXYGEN SATURATION: 98 % | HEART RATE: 92 BPM

## 2022-09-29 LAB
ANION GAP SERPL CALC-SCNC: 13 MMOL/L — SIGNIFICANT CHANGE UP (ref 7–14)
BUN SERPL-MCNC: 29 MG/DL — HIGH (ref 7–23)
CALCIUM SERPL-MCNC: 9.9 MG/DL — SIGNIFICANT CHANGE UP (ref 8.4–10.5)
CHLORIDE SERPL-SCNC: 101 MMOL/L — SIGNIFICANT CHANGE UP (ref 98–107)
CO2 SERPL-SCNC: 25 MMOL/L — SIGNIFICANT CHANGE UP (ref 22–31)
CREAT SERPL-MCNC: 1.28 MG/DL — SIGNIFICANT CHANGE UP (ref 0.5–1.3)
EGFR: 61 ML/MIN/1.73M2 — SIGNIFICANT CHANGE UP
GLUCOSE BLDC GLUCOMTR-MCNC: 173 MG/DL — HIGH (ref 70–99)
GLUCOSE BLDC GLUCOMTR-MCNC: 219 MG/DL — HIGH (ref 70–99)
GLUCOSE SERPL-MCNC: 203 MG/DL — HIGH (ref 70–99)
HCT VFR BLD CALC: 43.6 % — SIGNIFICANT CHANGE UP (ref 39–50)
HGB BLD-MCNC: 14.5 G/DL — SIGNIFICANT CHANGE UP (ref 13–17)
MAGNESIUM SERPL-MCNC: 1.9 MG/DL — SIGNIFICANT CHANGE UP (ref 1.6–2.6)
MCHC RBC-ENTMCNC: 29.5 PG — SIGNIFICANT CHANGE UP (ref 27–34)
MCHC RBC-ENTMCNC: 33.3 GM/DL — SIGNIFICANT CHANGE UP (ref 32–36)
MCV RBC AUTO: 88.8 FL — SIGNIFICANT CHANGE UP (ref 80–100)
NRBC # BLD: 0 /100 WBCS — SIGNIFICANT CHANGE UP (ref 0–0)
NRBC # FLD: 0 K/UL — SIGNIFICANT CHANGE UP (ref 0–0)
PHOSPHATE SERPL-MCNC: 3.2 MG/DL — SIGNIFICANT CHANGE UP (ref 2.5–4.5)
PLATELET # BLD AUTO: 158 K/UL — SIGNIFICANT CHANGE UP (ref 150–400)
POTASSIUM SERPL-MCNC: 4.1 MMOL/L — SIGNIFICANT CHANGE UP (ref 3.5–5.3)
POTASSIUM SERPL-SCNC: 4.1 MMOL/L — SIGNIFICANT CHANGE UP (ref 3.5–5.3)
RBC # BLD: 4.91 M/UL — SIGNIFICANT CHANGE UP (ref 4.2–5.8)
RBC # FLD: 12.6 % — SIGNIFICANT CHANGE UP (ref 10.3–14.5)
SODIUM SERPL-SCNC: 139 MMOL/L — SIGNIFICANT CHANGE UP (ref 135–145)
WBC # BLD: 4.17 K/UL — SIGNIFICANT CHANGE UP (ref 3.8–10.5)
WBC # FLD AUTO: 4.17 K/UL — SIGNIFICANT CHANGE UP (ref 3.8–10.5)

## 2022-09-29 PROCEDURE — 99232 SBSQ HOSP IP/OBS MODERATE 35: CPT

## 2022-09-29 RX ORDER — CIPROFLOXACIN LACTATE 400MG/40ML
1 VIAL (ML) INTRAVENOUS
Qty: 10 | Refills: 0
Start: 2022-09-29 | End: 2022-10-03

## 2022-09-29 RX ADMIN — Medication 81 MILLIGRAM(S): at 12:30

## 2022-09-29 RX ADMIN — Medication 1: at 07:50

## 2022-09-29 RX ADMIN — Medication 2: at 11:23

## 2022-09-29 RX ADMIN — Medication 500 MILLIGRAM(S): at 06:38

## 2022-09-29 RX ADMIN — FINASTERIDE 5 MILLIGRAM(S): 5 TABLET, FILM COATED ORAL at 12:30

## 2022-09-29 RX ADMIN — CLOPIDOGREL BISULFATE 75 MILLIGRAM(S): 75 TABLET, FILM COATED ORAL at 12:30

## 2022-09-29 NOTE — PROGRESS NOTE ADULT - SUBJECTIVE AND OBJECTIVE BOX
CC: Patient is a 66y old  Male who presents with a chief complaint of Generalized weakness, fever, chills (29 Sep 2022 12:32)    ID following for UTI, fever    Interval History/ROS: Patient doing well. No longer with fevers. No complaints. No urinary complaints. No dysuria. No abd pain. No suprapubic pain.     Rest of ROS negative.    Allergies  No Known Allergies    ANTIMICROBIALS:  ciprofloxacin     Tablet 500 every 12 hours    OTHER MEDS:  acetaminophen     Tablet .. 650 milliGRAM(s) Oral every 6 hours PRN  aspirin enteric coated 81 milliGRAM(s) Oral daily  clopidogrel Tablet 75 milliGRAM(s) Oral daily  dextrose 5%. 1000 milliLiter(s) IV Continuous <Continuous>  dextrose 5%. 1000 milliLiter(s) IV Continuous <Continuous>  dextrose 50% Injectable 25 Gram(s) IV Push once  dextrose 50% Injectable 12.5 Gram(s) IV Push once  dextrose 50% Injectable 25 Gram(s) IV Push once  dextrose Oral Gel 15 Gram(s) Oral once PRN  fenofibrate Tablet 145 milliGRAM(s) Oral at bedtime  finasteride 5 milliGRAM(s) Oral daily  influenza  Vaccine (HIGH DOSE) 0.7 milliLiter(s) IntraMuscular once  insulin lispro (ADMELOG) corrective regimen sliding scale   SubCutaneous three times a day before meals  insulin lispro (ADMELOG) corrective regimen sliding scale   SubCutaneous at bedtime  ondansetron Injectable 4 milliGRAM(s) IV Push every 8 hours PRN  polyethylene glycol 3350 17 Gram(s) Oral daily PRN  senna 2 Tablet(s) Oral at bedtime  simvastatin 20 milliGRAM(s) Oral at bedtime  sodium chloride 0.9%. 1000 milliLiter(s) IV Continuous <Continuous>  tamsulosin 0.4 milliGRAM(s) Oral at bedtime    PE:    Vital Signs Last 24 Hrs  T(C): 37 (29 Sep 2022 10:09), Max: 37.3 (28 Sep 2022 18:28)  T(F): 98.6 (29 Sep 2022 10:09), Max: 99.2 (28 Sep 2022 18:28)  HR: 92 (29 Sep 2022 10:09) (78 - 92)  BP: 132/86 (29 Sep 2022 10:09) (119/72 - 138/67)  BP(mean): --  RR: 16 (29 Sep 2022 10:09) (16 - 18)  SpO2: 98% (29 Sep 2022 10:09) (97% - 98%)    Parameters below as of 29 Sep 2022 10:09  Patient On (Oxygen Delivery Method): room air    Gen: AOx3, NAD  CV: S1+S2 normal, no murmurs  Resp: Clear bilat, no resp distress  Abd: Soft, nontender, +BS  Ext: No LE edema, no wounds  : No Almendarez  IV/Skin: No thrombophlebitis  Neuro: no focal deficits    LABS:                          14.5   4.17  )-----------( 158      ( 29 Sep 2022 06:20 )             43.6       09-29    139  |  101  |  29<H>  ----------------------------<  203<H>  4.1   |  25  |  1.28    Ca    9.9      29 Sep 2022 06:20  Phos  3.2     09-29  Mg     1.90     09-29    MICROBIOLOGY:  v  .Blood Blood-Peripheral  09-25-22   No growth to date.  --  --      .Blood Blood-Peripheral  09-25-22   No growth to date.  --  --      Clean Catch Clean Catch (Midstream)  09-25-22   >100,000 CFU/ml Klebsiella aerogenes (Previously Enterobacter)  --  Klebsiella aerogenes (Previously Enterobacter)    Rapid RVP Result: NotDetec (09-28 @ 16:38)  Rapid RVP Result: NotDetec (09-25 @ 06:45)    RADIOLOGY:    < from: US Kidney and Bladder (09.27.22 @ 09:19) >  IMPRESSION:  *  Mild bilateral hydronephrosis, which persists post void.  *  Enlarged prostate.  *  Post void residual of 54 mL.  *  Bilateral renal cysts.    < end of copied text >

## 2022-09-29 NOTE — PROGRESS NOTE ADULT - SUBJECTIVE AND OBJECTIVE BOX
DATE OF SERVICE: 09-29-22    Patient denies chest pain or shortness of breath.   Review of symptoms otherwise negative.    MEDICATIONS:  acetaminophen     Tablet .. 650 milliGRAM(s) Oral every 6 hours PRN  aspirin enteric coated 81 milliGRAM(s) Oral daily  ciprofloxacin     Tablet 500 milliGRAM(s) Oral every 12 hours  clopidogrel Tablet 75 milliGRAM(s) Oral daily  dextrose 5%. 1000 milliLiter(s) IV Continuous <Continuous>  dextrose 5%. 1000 milliLiter(s) IV Continuous <Continuous>  dextrose 50% Injectable 25 Gram(s) IV Push once  dextrose 50% Injectable 12.5 Gram(s) IV Push once  dextrose 50% Injectable 25 Gram(s) IV Push once  dextrose Oral Gel 15 Gram(s) Oral once PRN  fenofibrate Tablet 145 milliGRAM(s) Oral at bedtime  finasteride 5 milliGRAM(s) Oral daily  influenza  Vaccine (HIGH DOSE) 0.7 milliLiter(s) IntraMuscular once  insulin lispro (ADMELOG) corrective regimen sliding scale   SubCutaneous three times a day before meals  insulin lispro (ADMELOG) corrective regimen sliding scale   SubCutaneous at bedtime  ondansetron Injectable 4 milliGRAM(s) IV Push every 8 hours PRN  polyethylene glycol 3350 17 Gram(s) Oral daily PRN  senna 2 Tablet(s) Oral at bedtime  simvastatin 20 milliGRAM(s) Oral at bedtime  sodium chloride 0.9%. 1000 milliLiter(s) IV Continuous <Continuous>  tamsulosin 0.4 milliGRAM(s) Oral at bedtime    LABS:                        14.5   4.17  )-----------( 158      ( 29 Sep 2022 06:20 )             43.6       Hemoglobin: 14.5 g/dL (09-29 @ 06:20)  Hemoglobin: 15.7 g/dL (09-28 @ 10:18)  Hemoglobin: 14.1 g/dL (09-27 @ 10:00)  Hemoglobin: 14.2 g/dL (09-26 @ 05:15)  Hemoglobin: 13.4 g/dL (09-25 @ 20:25)      09-29    139  |  101  |  29<H>  ----------------------------<  203<H>  4.1   |  25  |  1.28    Ca    9.9      29 Sep 2022 06:20  Phos  3.2     09-29  Mg     1.90     09-29    Creatinine Trend: 1.28<--, 1.26<--, 1.23<--, 1.24<--, 1.22<--  COAGS:     PHYSICAL EXAM:  T(C): 37 (09-29-22 @ 10:09), Max: 37.3 (09-28-22 @ 18:28)  HR: 92 (09-29-22 @ 10:09) (78 - 92)  BP: 132/86 (09-29-22 @ 10:09) (119/72 - 138/67)  RR: 16 (09-29-22 @ 10:09) (16 - 18)  SpO2: 98% (09-29-22 @ 10:09) (97% - 98%)  Wt(kg): --    I&O's Summary    28 Sep 2022 07:01  -  29 Sep 2022 07:00  --------------------------------------------------------  IN: 0 mL / OUT: 200 mL / NET: -200 mL      General: Well nourished in no acute distress. Alert and Oriented * 3.   Head: Normocephalic and atraumatic.   Neck: No JVD. No bruits. Supple. Does not appear to be enlarged.   Cardiovascular: + S1,S2 ; RRR Soft systolic murmur at the left lower sternal border. No rubs noted.    Lungs: CTA b/l. No rhonchi, rales or wheezes.   Abdomen: + BS, soft. Non tender. Non distended. No rebound. No guarding.   Extremities: No clubbing/cyanosis/edema.   Neurologic: Moves all four extremities. Full range of motion.   Skin: Warm and moist. The patient's skin has normal elasticity and good skin turgor.   Psychiatric: Appropriate mood and affect.  Musculoskeletal: Normal range of motion, normal strength     ASSESSMENT/PLAN:  66M PMHx CAD s/p PCI on DAPT (Oct 2021), DM, HTN, HLD, kidney stones with prior hx of 4mm R UVJ stone s/p R ureteral stent (removed 1 month later) with associated urospesis/E coli bacteremia in 2017 who presents with generalized weakness, nausea, chills that started after dinner last night.     - pt. with no chest pain or anginal symptoms  - recommend continuing medical therapy of known CAD - currently on dapt for pci in october of 2021. will follow with primary cardiologist Dr. Tutu Villafana.  - c/w statin  - monitor bcx    Ester Brian MD  Pager: [phone]

## 2022-09-29 NOTE — DISCHARGE NOTE NURSING/CASE MANAGEMENT/SOCIAL WORK - NSDCPEFALRISK_GEN_ALL_CORE
For information on Fall & Injury Prevention, visit: [email or web address] OR  [email or web address] OR  https://www.cdc.gov/steadi/patient.html

## 2022-09-29 NOTE — PROGRESS NOTE ADULT - ASSESSMENT
66 year old male with CAD s/p PCI, DM, HTN, HLD, kidney stones in the past requiring R ureteral stent with E. coli bacteremia in 2017 now presenting with generalized weakness, nausea, chills and difficulties in urination. Had R flank pain a few days ago now resolved. Yesterday had hematuria and inability to urinate, suspect he passed a stone that initially caused bleeding and obstruction. Today feeling better, no problems urinating, no dysuria. Leukocytosis resolved.     Blood cultures negative, urine culture with Kleb aerogenes.  Renal US with mild B/L hydro, enlarged prostate  Fever 9/27 noted    Recommend:  #Sepsis (fever, leukocytosis) secondary to UTI/ pyelo, suspect had urinary obstruction from kidney stone which he may have passed  -Continue cipro 500 mg PO q 12 hours - can complete 5 more days   -Blood cxs negative to date  -CT without stones     #Renal hypodensities on CT  -with enlarged prostate and mild hydro post void  -states voiding well now  -Seen by urology    Siva Trevino MD  Available through MS Teams  If no response, or after 5pm/weekends, call [phone]    Will sign off. Please call with questions.  
66 year old male with CAD s/p PCI, DM, HTN, HLD, kidney stones in the past requiring R ureteral stent with E. coli bacteremia in 2017 now presenting with generalized weakness, nausea, chills and difficulties in urination. Had R flank pain a few days ago now resolved. Yesterday had hematuria and inability to urinate, suspect he passed a stone that initially caused bleeding and obstruction. Today feeling better, no problems urinating, no dysuria. Leukocytosis improving. Fever curve better.    Blood cultures negative, urine culture with Kleb aerogenes.  Renal US with mild B/L hydro, enlarged prostate    Recommend:  #Sepsis (fever, leukocytosis) secondary to UTI, suspect had urinary obstruction from kidney stone which he may have passed  -Continue ceftriaxone  -F/U urine culture sensitivities  -Blood cxs negative to date  -CT without stones    #Renal hypodensities on CT  -with enlarged prostate and mild hydro post void  -states voiding well now    Siva Trevino MD  Available through MS Teams  If no response, or after 5pm/weekends, call [phone]    
66 year old male with CAD s/p PCI, DM, HTN, HLD, kidney stones in the past requiring R ureteral stent with E. coli bacteremia in 2017 now presenting with generalized weakness, nausea, chills and difficulties in urination. Had R flank pain a few days ago now resolved. Yesterday had hematuria and inability to urinate, suspect he passed a stone that initially caused bleeding and obstruction. Today feeling better, no problems urinating, no dysuria. Leukocytosis resolved.     Blood cultures negative, urine culture with Kleb aerogenes.  Renal US with mild B/L hydro, enlarged prostate  Febrile yesterday    Recommend:  #Sepsis (fever, leukocytosis) secondary to UTI, suspect had urinary obstruction from kidney stone which he may have passed  -Change ceftriaxone to cipro 500 mg PO q 12 hours  -Blood cxs negative to date  -CT without stones   -Check RVP for new fever, having loss of voice - ?viral syndrome    #Renal hypodensities on CT  -with enlarged prostate and mild hydro post void  -states voiding well now  -Urology called    Siva Trevino MD  Available through MS Teams  If no response, or after 5pm/weekends, call [phone]    
66M PMHx CAD s/p PCI on DAPT (Oct 2021), DM, HTN, HLD, kidney stones with prior hx of 4mm R UVJ stone s/p R ureteral stent (removed 1 month later) with associated urospesis/E coli bacteremia in 2017 who presents with generalized weakness, nausea, chills found to have U.T.I.     Problem/Plan - 1:  ·  Problem: Acute UTI.   ·  Plan: - Patient with R flank pain 4-5 day ago that resolved, but now with fever, chills, dysuria/frequency; upon later assessment in afternoon, patient now with hematuria and urinary urgency   - UA grossly positive with pyuria  - likely sec to passed stone  - cw antibiotics as per ID     Problem/Plan - 2:  ·  Problem: Renal stone.   ·  Plan: -   - f/u CT A/P as above   - urology fu     Problem/Plan - 3:  ·  Problem: CAD (coronary artery disease).   ·  Plan: - s/p PCI in Oc 2021 on DAPT with ASA and Plavix  - continue ASA and plavix  - hold metoprolol for the moment in setting of infection, possible sepsis   - continue statin  - no CP, EKG no acute ischemic changes.    Problem/Plan - 4:  ·  Problem: HTN (hypertension).   ·  Plan: - BP stable, low normal  - hold metoprolol and losartan for now in setting of possible sepsis   - restart as tolerated if BP stable.    Problem/Plan - 5:  ·  Problem: DM (diabetes mellitus).   ·  Plan: - Patient takes metformin and jardiance at home - he states jardiance costs him over $500/90 day supply   - monitor FS AC/HS  - ISS while admitted.    Problem/Plan - 6:  ·  Problem: HLD (hyperlipidemia).   ·  Plan: - continue statin
66M PMHx CAD s/p PCI on DAPT (Oct 2021), DM, HTN, HLD, kidney stones with prior hx of 4mm R UVJ stone s/p R ureteral stent (removed 1 month later) with associated urospesis/E coli bacteremia in 2017 who presents with generalized weakness, nausea, chills found to have U.T.I.     Problem/Plan - 1:  ·  Problem: Acute UTI.   ·  Plan: - Patient with R flank pain 4-5 day ago that resolved, but now with fever, chills, dysuria/frequency; upon later assessment in afternoon, patient now with hematuria and urinary urgency   - UA grossly positive with pyuria  - likely sec to passed stone  - cw antibiotics as per ID     Problem/Plan - 2:  ·  Problem: Renal stone.   ·  Plan: -   - f/u CT A/P as above   - urology fu     Problem/Plan - 3:  ·  Problem: CAD (coronary artery disease).   ·  Plan: - s/p PCI in Oc 2021 on DAPT with ASA and Plavix  - continue ASA and plavix  - hold metoprolol for the moment in setting of infection, possible sepsis   - continue statin  - no CP, EKG no acute ischemic changes.    Problem/Plan - 4:  ·  Problem: HTN (hypertension).   ·  Plan: - BP stable, low normal  - hold metoprolol and losartan for now in setting of possible sepsis   - restart as tolerated if BP stable.    Problem/Plan - 5:  ·  Problem: DM (diabetes mellitus).   ·  Plan: - Patient takes metformin and jardiance at home - he states jardiance costs him over $500/90 day supply   - monitor FS AC/HS  - ISS while admitted.    Problem/Plan - 6:  ·  Problem: HLD (hyperlipidemia).   ·  Plan: - continue statin
66M PMHx CAD s/p PCI on DAPT (Oct 2021), DM, HTN, HLD, kidney stones with prior hx of 4mm R UVJ stone s/p R ureteral stent (removed 1 month later) with associated urospesis/E coli bacteremia in 2017 who presents with generalized weakness, nausea, chills found to have U.T.I.     Problem/Plan - 1:  ·  Problem: Acute UTI.   ·  Plan: - Patient with R flank pain 4-5 day ago that resolved, but now with fever, chills, dysuria/frequency; upon later assessment in afternoon, patient now with hematuria and urinary urgency   - UA grossly positive with pyuria  - likely sec to passed stone  - cw antibiotics as per ID   - urology fu pending     Problem/Plan - 2:  ·  Problem: Renal stone.   ·  Plan: -   - f/u CT A/P as above   - urology fu     Problem/Plan - 3:  ·  Problem: CAD (coronary artery disease).   ·  Plan: - s/p PCI in Oc 2021 on DAPT with ASA and Plavix  - continue ASA and plavix  - hold metoprolol for the moment in setting of infection, possible sepsis   - continue statin  - no CP, EKG no acute ischemic changes.    Problem/Plan - 4:  ·  Problem: HTN (hypertension).   ·  Plan: - BP stable, low normal  - hold metoprolol and losartan for now in setting of possible sepsis   - restart as tolerated if BP stable.    Problem/Plan - 5:  ·  Problem: DM (diabetes mellitus).   ·  Plan: - Patient takes metformin and jardiance at home - he states jardiance costs him over $500/90 day supply   - monitor FS AC/HS  - ISS while admitted.    Problem/Plan - 6:  ·  Problem: HLD (hyperlipidemia).   ·  Plan: - continue statin

## 2022-09-29 NOTE — DISCHARGE NOTE NURSING/CASE MANAGEMENT/SOCIAL WORK - NSDCPNINST_GEN_ALL_CORE
Make a follow up appointment with Dr. Brian. Drink plenty of liquids. Your A1C- 7.7. Continue to follow a consistent carbohydrate diet and take your medications for diabetes. Follow up with your endocrinologist for better diabetes management.

## 2022-09-29 NOTE — DISCHARGE NOTE NURSING/CASE MANAGEMENT/SOCIAL WORK - PATIENT PORTAL LINK FT
You can access the FollowMyHealth Patient Portal offered by [facility] by registering at the following website: [email or web address]. By joining [employer]’s FollowMyHealth portal, you will also be able to view your health information using other applications (apps) compatible with our system.
Home

## 2022-09-29 NOTE — PROGRESS NOTE ADULT - PROVIDER SPECIALTY LIST ADULT
Cardiology
Cardiology
Hospitalist
Cardiology
Infectious Disease

## 2022-09-29 NOTE — PROGRESS NOTE ADULT - REASON FOR ADMISSION
Generalized weakness, fever, chills

## 2022-09-30 LAB
CULTURE RESULTS: SIGNIFICANT CHANGE UP
CULTURE RESULTS: SIGNIFICANT CHANGE UP
SPECIMEN SOURCE: SIGNIFICANT CHANGE UP
SPECIMEN SOURCE: SIGNIFICANT CHANGE UP

## 2022-10-01 PROBLEM — E78.5 HYPERLIPIDEMIA, UNSPECIFIED: Chronic | Status: ACTIVE | Noted: 2022-09-25

## 2022-10-01 PROBLEM — N20.0 CALCULUS OF KIDNEY: Chronic | Status: ACTIVE | Noted: 2017-11-01

## 2022-10-01 PROBLEM — I25.10 ATHEROSCLEROTIC HEART DISEASE OF NATIVE CORONARY ARTERY WITHOUT ANGINA PECTORIS: Chronic | Status: ACTIVE | Noted: 2022-09-25

## 2022-10-04 ENCOUNTER — APPOINTMENT (OUTPATIENT)
Dept: UROLOGY | Facility: CLINIC | Age: 67
End: 2022-10-04

## 2022-10-04 VITALS
OXYGEN SATURATION: 97 % | RESPIRATION RATE: 17 BRPM | SYSTOLIC BLOOD PRESSURE: 109 MMHG | BODY MASS INDEX: 23.73 KG/M2 | WEIGHT: 147 LBS | TEMPERATURE: 97.5 F | DIASTOLIC BLOOD PRESSURE: 66 MMHG | HEART RATE: 89 BPM

## 2022-10-04 LAB
APPEARANCE: CLEAR
BACTERIA: NEGATIVE
BILIRUBIN URINE: NEGATIVE
BLOOD URINE: NEGATIVE
COLOR: NORMAL
GLUCOSE QUALITATIVE U: ABNORMAL
HYALINE CASTS: 0 /LPF
KETONES URINE: NEGATIVE
LEUKOCYTE ESTERASE URINE: NEGATIVE
MICROSCOPIC-UA: NORMAL
NITRITE URINE: NEGATIVE
PH URINE: 6
PROTEIN URINE: NEGATIVE
RED BLOOD CELLS URINE: 0 /HPF
SPECIFIC GRAVITY URINE: 1.02
SQUAMOUS EPITHELIAL CELLS: 0 /HPF
UROBILINOGEN URINE: NORMAL
WHITE BLOOD CELLS URINE: 1 /HPF

## 2022-10-04 PROCEDURE — 99204 OFFICE O/P NEW MOD 45 MIN: CPT

## 2022-10-04 NOTE — ADDENDUM
[FreeTextEntry1] : Entered by HUY DEAL, acting as scribe for Dr. Gamal Caicedo.\par The documentation recorded by the scribe accurately reflects the service I personally performed and the decisions made by me.

## 2022-10-05 LAB — BACTERIA UR CULT: NORMAL

## 2022-10-08 LAB
CREAT 24H UR-MCNC: 1.2 G/24 H
CREAT ?TM UR-MCNC: 42 MG/DL
PROT 24H UR-MRATE: 8 MG/DL
PROT ?TM UR-MCNC: 24 HR
PROT UR-MCNC: 236 MG/24 H
SPECIMEN VOL 24H UR: 2950 ML

## 2022-10-24 ENCOUNTER — APPOINTMENT (OUTPATIENT)
Dept: NEUROLOGY | Facility: CLINIC | Age: 67
End: 2022-10-24

## 2022-11-01 ENCOUNTER — APPOINTMENT (OUTPATIENT)
Dept: UROLOGY | Facility: CLINIC | Age: 67
End: 2022-11-01

## 2022-11-01 VITALS
RESPIRATION RATE: 18 BRPM | HEART RATE: 90 BPM | BODY MASS INDEX: 24.76 KG/M2 | TEMPERATURE: 96.4 F | WEIGHT: 153.4 LBS | DIASTOLIC BLOOD PRESSURE: 85 MMHG | SYSTOLIC BLOOD PRESSURE: 138 MMHG | OXYGEN SATURATION: 96 %

## 2022-11-01 DIAGNOSIS — N20.1 CALCULUS OF URETER: ICD-10-CM

## 2022-11-01 PROCEDURE — 99214 OFFICE O/P EST MOD 30 MIN: CPT

## 2022-11-01 NOTE — LETTER BODY
[FreeTextEntry1] : Megan Alberto \par [address] [facility] [address]\[place] [address]\par [phone]\par \par Dear Dr. Alberto,\par \par Reason for Visit: Proteinuria. Right hydrocele. BPH.\par \par This is a 66 year-old Cantonese speaking gentleman with history of renal stones and diabetes, presents with proteinuria. Patient previously underwent ureteroscopy for renal stone. He is seeing nephrologist Dr. David Caicedo. Patient was hospitalized for infection and developed right orchitis. The patient took a course of antibiotics and his pain resolved. Patient returns today for follow up. Since he was last seen, patient obtained an ultrasound that demonstrated reactive left hydrocele. Patient reports that his symptoms resolved. Patient continues to report taking Flomax and Proscar regularly without any difficulties or side effects. He reports improvement of urinary symptoms on medical therapy. All other review of systems are negative. He has no cancer in his family medical history. He previously had gallbladder surgery. Past medical history, family history and social history were inquired and were noncontributory to current condition. The patient does not use tobacco or drink alcohol. Medications and allergies were reviewed. He has no known allergies to medication. \par \par On examination, the patient is a healthy-appearing gentleman in no acute distress. He is alert and oriented follows commands. He has normal mood and affect. He is normocephalic. Oral no thrush. Neck is supple. Respirations are unlabored. His abdomen is soft and nontender. Liver is nonpalpable. Bladder is nonpalpable. No CVA tenderness. Neurologically he is grossly intact. No peripheral edema. Skin without gross abnormality. He has normal male external genitalia. Normal meatus. Bilateral testes are descended intrascrotally and normal to palpation. On rectal examination, there is normal sphincter tone. The prostate is clinically benign without focal induration or nodularity. On examination, patient has right hydrocele.\par \par His urinalysis from October 2022 was unremarkable. His urine culture was negative. His 24 hour protein total demonstrated evidence of proteinuria.\par \par I personally reviewed ultrasound images with the patient today and images demonstrated reactive left hydrocele.\par \par Assessment: Proteinuria. Right hydrocele. BPH, stable on Flomax and Proscar. Left hydrocele, resolved.\par \par I counseled the patient. In terms of patient's hydrocele, his urinalysis and urine culture were negative. Patient's symptoms have resolved. In terms of his BPH, patient reports stable symptoms on Flomax and Proscar. I renewed the patient's prescription for Flomax and Proscar today. I encouraged the patient to continue medications regularly as directed. I also recommended he obtain PSA and BMP to ensure stability. In terms of his proteinuria, I recommended patient see Nephrology. Risks and alternatives were discussed. I answered the patient questions. The patient will follow-up as directed and will contact me with any questions or concerns. Thank you for the opportunity to participate in the care of this patient. I'll keep you updated on his progress.\par \par Plan: Continue Flomax and Proscar. PSA. BMP. Follow up in 6 months.

## 2022-11-01 NOTE — HISTORY OF PRESENT ILLNESS
[FreeTextEntry1] : Follow-up BPH.  Continue Flomax and Proscar.  PSA today.\par \par Follow-up orchitis.  Ultrasound demonstrated reactive left hydrocele.  Symptoms resolved.\par \par Follow-up 6 months.\par \par Please refer to URO Consult note

## 2022-11-02 LAB
ANION GAP SERPL CALC-SCNC: 15 MMOL/L
BUN SERPL-MCNC: 25 MG/DL
CALCIUM SERPL-MCNC: 10.3 MG/DL
CHLORIDE SERPL-SCNC: 103 MMOL/L
CO2 SERPL-SCNC: 23 MMOL/L
CREAT SERPL-MCNC: 1.28 MG/DL
EGFR: 61 ML/MIN/1.73M2
GLUCOSE SERPL-MCNC: 117 MG/DL
POTASSIUM SERPL-SCNC: 4.3 MMOL/L
PSA FREE FLD-MCNC: 30 %
PSA FREE SERPL-MCNC: 1.14 NG/ML
PSA SERPL-MCNC: 3.8 NG/ML
SODIUM SERPL-SCNC: 141 MMOL/L

## 2022-11-02 NOTE — PATIENT PROFILE ADULT. - TYPE OF ADMISSION, PATIENT PROFILE
Bactrim Pregnancy And Lactation Text: This medication is Pregnancy Category D and is known to cause fetal risk.  It is also excreted in breast milk. Emergent/ED

## 2023-05-02 ENCOUNTER — APPOINTMENT (OUTPATIENT)
Dept: UROLOGY | Facility: CLINIC | Age: 68
End: 2023-05-02

## 2023-07-10 ENCOUNTER — APPOINTMENT (OUTPATIENT)
Dept: ORTHOPEDIC SURGERY | Facility: CLINIC | Age: 68
End: 2023-07-10
Payer: MEDICARE

## 2023-07-10 VITALS
SYSTOLIC BLOOD PRESSURE: 125 MMHG | HEART RATE: 75 BPM | BODY MASS INDEX: 24.43 KG/M2 | TEMPERATURE: 97.8 F | WEIGHT: 152 LBS | HEIGHT: 66 IN | DIASTOLIC BLOOD PRESSURE: 75 MMHG | OXYGEN SATURATION: 97 %

## 2023-07-10 DIAGNOSIS — M48.02 SPINAL STENOSIS, CERVICAL REGION: ICD-10-CM

## 2023-07-10 PROCEDURE — 99204 OFFICE O/P NEW MOD 45 MIN: CPT

## 2023-07-10 PROCEDURE — 99205 OFFICE O/P NEW HI 60 MIN: CPT

## 2023-09-19 ENCOUNTER — APPOINTMENT (OUTPATIENT)
Dept: UROLOGY | Facility: CLINIC | Age: 68
End: 2023-09-19
Payer: MEDICARE

## 2023-09-19 VITALS
SYSTOLIC BLOOD PRESSURE: 162 MMHG | HEART RATE: 81 BPM | OXYGEN SATURATION: 96 % | BODY MASS INDEX: 25.7 KG/M2 | WEIGHT: 159.2 LBS | TEMPERATURE: 97.4 F | DIASTOLIC BLOOD PRESSURE: 90 MMHG | RESPIRATION RATE: 18 BRPM

## 2023-09-19 DIAGNOSIS — M25.522 PAIN IN LEFT ELBOW: ICD-10-CM

## 2023-09-19 DIAGNOSIS — N13.8 BENIGN PROSTATIC HYPERPLASIA WITH LOWER URINARY TRACT SYMPMS: ICD-10-CM

## 2023-09-19 DIAGNOSIS — N40.1 BENIGN PROSTATIC HYPERPLASIA WITH LOWER URINARY TRACT SYMPMS: ICD-10-CM

## 2023-09-19 DIAGNOSIS — N45.2 ORCHITIS: ICD-10-CM

## 2023-09-19 PROCEDURE — 99214 OFFICE O/P EST MOD 30 MIN: CPT

## 2023-09-19 RX ORDER — FINASTERIDE 5 MG/1
5 TABLET, FILM COATED ORAL DAILY
Qty: 90 | Refills: 3 | Status: ACTIVE | COMMUNITY
Start: 2022-10-04 | End: 1900-01-01

## 2023-09-19 RX ORDER — TAMSULOSIN HYDROCHLORIDE 0.4 MG/1
0.4 CAPSULE ORAL
Qty: 180 | Refills: 3 | Status: ACTIVE | COMMUNITY
Start: 2022-10-04 | End: 1900-01-01

## 2023-09-20 DIAGNOSIS — R80.9 PROTEINURIA, UNSPECIFIED: ICD-10-CM

## 2023-09-20 LAB
ANION GAP SERPL CALC-SCNC: 15 MMOL/L
APPEARANCE: CLEAR
BACTERIA: NEGATIVE /HPF
BILIRUBIN URINE: NEGATIVE
BLOOD URINE: ABNORMAL
BUN SERPL-MCNC: 23 MG/DL
CALCIUM SERPL-MCNC: 10.3 MG/DL
CAST: 0 /LPF
CHLORIDE SERPL-SCNC: 100 MMOL/L
CO2 SERPL-SCNC: 25 MMOL/L
COLOR: YELLOW
CREAT SERPL-MCNC: 1.15 MG/DL
EGFR: 69 ML/MIN/1.73M2
EPITHELIAL CELLS: 0 /HPF
GLUCOSE QUALITATIVE U: >=1000 MG/DL
GLUCOSE SERPL-MCNC: 324 MG/DL
KETONES URINE: NEGATIVE MG/DL
LEUKOCYTE ESTERASE URINE: NEGATIVE
MICROSCOPIC-UA: NORMAL
NITRITE URINE: NEGATIVE
PH URINE: 6
POTASSIUM SERPL-SCNC: 4.3 MMOL/L
PROTEIN URINE: 100 MG/DL
PSA FREE FLD-MCNC: 34 %
PSA FREE SERPL-MCNC: 0.79 NG/ML
PSA SERPL-MCNC: 2.31 NG/ML
RED BLOOD CELLS URINE: 0 /HPF
SODIUM SERPL-SCNC: 140 MMOL/L
SPECIFIC GRAVITY URINE: 1.02
UROBILINOGEN URINE: 1 MG/DL
WHITE BLOOD CELLS URINE: 1 /HPF

## 2023-09-21 LAB — BACTERIA UR CULT: NORMAL

## 2023-10-04 LAB
CREAT 24H UR-MCNC: 1.1 G/24 H
CREAT ?TM UR-MCNC: 38 MG/DL
PROT 24H UR-MRATE: 30 MG/DL
PROT ?TM UR-MCNC: 24 HR
PROT UR-MCNC: 855 MG/24 H
SPECIMEN VOL 24H UR: 2850 ML

## 2023-10-24 ENCOUNTER — OFFICE (OUTPATIENT)
Dept: URBAN - METROPOLITAN AREA CLINIC 92 | Facility: CLINIC | Age: 68
Setting detail: OPHTHALMOLOGY
End: 2023-10-24
Payer: COMMERCIAL

## 2023-10-24 ENCOUNTER — RX ONLY (RX ONLY)
Age: 68
End: 2023-10-24

## 2023-10-24 DIAGNOSIS — H01.004: ICD-10-CM

## 2023-10-24 DIAGNOSIS — E11.9: ICD-10-CM

## 2023-10-24 DIAGNOSIS — H16.223: ICD-10-CM

## 2023-10-24 DIAGNOSIS — H40.063: ICD-10-CM

## 2023-10-24 DIAGNOSIS — H25.13: ICD-10-CM

## 2023-10-24 DIAGNOSIS — H01.001: ICD-10-CM

## 2023-10-24 DIAGNOSIS — H35.40: ICD-10-CM

## 2023-10-24 PROCEDURE — 92020 GONIOSCOPY: CPT | Performed by: OPHTHALMOLOGY

## 2023-10-24 PROCEDURE — 92014 COMPRE OPH EXAM EST PT 1/>: CPT | Performed by: OPHTHALMOLOGY

## 2023-10-24 ASSESSMENT — TEAR BREAK UP TIME (TBUT)
OS_TBUT: 2+
OD_TBUT: 2+

## 2023-10-24 ASSESSMENT — REFRACTION_AUTOREFRACTION
OS_AXIS: 067
OS_CYLINDER: +1.00
OS_SPHERE: -7.50
OD_CYLINDER: +1.00
OD_SPHERE: -5.75
OD_AXIS: 005

## 2023-10-24 ASSESSMENT — REFRACTION_CURRENTRX
OD_AXIS: 091
OS_VPRISM_DIRECTION: PROGS
OS_CYLINDER: -1.75
OS_OVR_VA: 20/
OS_SPHERE: -6.50
OD_ADD: +2.75
OD_CYLINDER: -0.75
OD_OVR_VA: 20/
OS_AXIS: 106
OD_VPRISM_DIRECTION: PROGS
OS_ADD: +2.75
OD_SPHERE: -5.75

## 2023-10-24 ASSESSMENT — VISUAL ACUITY
OD_BCVA: 20/30+2
OS_BCVA: 20/30+1

## 2023-10-24 ASSESSMENT — KERATOMETRY
OD_K1POWER_DIOPTERS: 45.25
OS_K2POWER_DIOPTERS: 46.00
OS_AXISANGLE_DEGREES: 051
OD_K2POWER_DIOPTERS: 45.75
OS_K1POWER_DIOPTERS: 45.25
OD_AXISANGLE_DEGREES: 118
METHOD_AUTO_MANUAL: AUTO

## 2023-10-24 ASSESSMENT — SPHEQUIV_DERIVED
OS_SPHEQUIV: -7
OD_SPHEQUIV: -5.25

## 2023-10-24 ASSESSMENT — CONFRONTATIONAL VISUAL FIELD TEST (CVF)
OS_FINDINGS: FULL
OD_FINDINGS: FULL

## 2023-10-24 ASSESSMENT — TONOMETRY
OS_IOP_MMHG: 14
OD_IOP_MMHG: 14

## 2023-10-24 ASSESSMENT — LID EXAM ASSESSMENTS
OS_BLEPHARITIS: LUL 1+
OD_BLEPHARITIS: RUL 1+

## 2023-10-24 ASSESSMENT — AXIALLENGTH_DERIVED
OD_AL: 24.9798
OS_AL: 25.7137

## 2024-04-23 NOTE — PATIENT PROFILE ADULT - NSTOBACCONEVERSMOKERY/N_GEN_A
Kashif Rico  Neurosurgery  [address] [place] [id number]  Phone: [phone]  Fax: [phone]  Follow Up Time:    No

## 2024-06-26 NOTE — H&P ADULT - NEGATIVE GASTROINTESTINAL SYMPTOMS
Treat both eyes warm compress follow-up with your primary do not wear contacts obtain new contacts  Must complete treatment if not better see ophthalmologist  
no diarrhea/no constipation/no change in bowel habits/no abdominal pain/no melena/no hematochezia

## 2025-02-18 ENCOUNTER — OFFICE (OUTPATIENT)
Dept: URBAN - METROPOLITAN AREA CLINIC 28 | Facility: CLINIC | Age: 70
Setting detail: OPHTHALMOLOGY
End: 2025-02-18
Payer: MEDICARE

## 2025-02-18 DIAGNOSIS — H40.063: ICD-10-CM

## 2025-02-18 DIAGNOSIS — H25.13: ICD-10-CM

## 2025-02-18 DIAGNOSIS — E11.9: ICD-10-CM

## 2025-02-18 DIAGNOSIS — H16.223: ICD-10-CM

## 2025-02-18 DIAGNOSIS — H35.40: ICD-10-CM

## 2025-02-18 DIAGNOSIS — H01.001: ICD-10-CM

## 2025-02-18 DIAGNOSIS — H01.004: ICD-10-CM

## 2025-02-18 PROCEDURE — 92014 COMPRE OPH EXAM EST PT 1/>: CPT | Performed by: OPHTHALMOLOGY

## 2025-02-18 PROCEDURE — 92020 GONIOSCOPY: CPT | Performed by: OPHTHALMOLOGY

## 2025-02-18 ASSESSMENT — REFRACTION_CURRENTRX
OS_OVR_VA: 20/
OD_ADD: +2.75
OS_CYLINDER: -1.75
OS_VPRISM_DIRECTION: PROGS
OS_AXIS: 106
OD_AXIS: 091
OD_OVR_VA: 20/
OS_SPHERE: -6.50
OS_ADD: +2.75
OD_CYLINDER: -0.75
OD_SPHERE: -5.75
OD_VPRISM_DIRECTION: PROGS

## 2025-02-18 ASSESSMENT — KERATOMETRY
OS_K1POWER_DIOPTERS: 45.50
OD_K1POWER_DIOPTERS: 45.25
OD_K2POWER_DIOPTERS: 45.75
OS_AXISANGLE_DEGREES: 070
OD_AXISANGLE_DEGREES: 126
METHOD_AUTO_MANUAL: AUTO
OS_K2POWER_DIOPTERS: 46.50

## 2025-02-18 ASSESSMENT — REFRACTION_AUTOREFRACTION
OS_AXIS: 133
OD_AXIS: 080
OD_SPHERE: -5.25
OS_SPHERE: -6.50
OD_CYLINDER: -1.00
OS_CYLINDER: -0.50

## 2025-02-18 ASSESSMENT — VISUAL ACUITY
OD_BCVA: 20/30-1
OS_BCVA: 20/30-2

## 2025-02-18 ASSESSMENT — TONOMETRY
OS_IOP_MMHG: 17
OD_IOP_MMHG: 17

## 2025-02-18 ASSESSMENT — TEAR BREAK UP TIME (TBUT)
OS_TBUT: 2+ 3+
OD_TBUT: 2+ 3+

## 2025-02-18 ASSESSMENT — CONFRONTATIONAL VISUAL FIELD TEST (CVF)
OS_FINDINGS: FULL
OD_FINDINGS: FULL

## 2025-04-07 NOTE — DIETITIAN INITIAL EVALUATION ADULT. - NS AS NUTRI INTERV ED CONTENT
Nutrition relationship to health/disease/Purpose of the nutrition education/Recommended modifications
None

## 2025-05-16 ENCOUNTER — APPOINTMENT (OUTPATIENT)
Dept: UROLOGY | Facility: CLINIC | Age: 70
End: 2025-05-16

## 2025-05-31 ENCOUNTER — NON-APPOINTMENT (OUTPATIENT)
Age: 70
End: 2025-05-31

## 2025-06-02 ENCOUNTER — APPOINTMENT (OUTPATIENT)
Dept: ORTHOPEDIC SURGERY | Facility: CLINIC | Age: 70
End: 2025-06-02
Payer: MEDICARE

## 2025-06-02 VITALS
HEART RATE: 89 BPM | SYSTOLIC BLOOD PRESSURE: 113 MMHG | DIASTOLIC BLOOD PRESSURE: 70 MMHG | WEIGHT: 150 LBS | HEIGHT: 66 IN | BODY MASS INDEX: 24.11 KG/M2

## 2025-06-02 DIAGNOSIS — M25.572 PAIN IN LEFT ANKLE AND JOINTS OF LEFT FOOT: ICD-10-CM

## 2025-06-02 PROCEDURE — 99204 OFFICE O/P NEW MOD 45 MIN: CPT

## 2025-06-02 PROCEDURE — 73610 X-RAY EXAM OF ANKLE: CPT | Mod: LT

## 2025-07-15 ENCOUNTER — APPOINTMENT (OUTPATIENT)
Dept: UROLOGY | Facility: CLINIC | Age: 70
End: 2025-07-15
Payer: MEDICARE

## 2025-07-15 VITALS
OXYGEN SATURATION: 96 % | HEART RATE: 91 BPM | BODY MASS INDEX: 24.21 KG/M2 | RESPIRATION RATE: 18 BRPM | SYSTOLIC BLOOD PRESSURE: 119 MMHG | WEIGHT: 150 LBS | DIASTOLIC BLOOD PRESSURE: 75 MMHG

## 2025-07-15 PROCEDURE — 99214 OFFICE O/P EST MOD 30 MIN: CPT

## 2025-07-15 PROCEDURE — 76775 US EXAM ABDO BACK WALL LIM: CPT

## 2025-07-15 PROCEDURE — G2211 COMPLEX E/M VISIT ADD ON: CPT

## 2025-07-15 RX ORDER — SODIUM PHOSPHATE, DIBASIC AND SODIUM PHOSPHATE, MONOBASIC 7; 19 G/230ML; G/230ML
ENEMA RECTAL
Qty: 1 | Refills: 0 | Status: ACTIVE | COMMUNITY
Start: 2025-07-15 | End: 1900-01-01

## 2025-07-16 LAB
ANION GAP SERPL CALC-SCNC: 19 MMOL/L
APPEARANCE: CLEAR
BACTERIA: NEGATIVE /HPF
BILIRUBIN URINE: NEGATIVE
BLOOD URINE: ABNORMAL
BUN SERPL-MCNC: 16 MG/DL
CALCIUM SERPL-MCNC: 9.8 MG/DL
CAST: 0 /LPF
CHLORIDE SERPL-SCNC: 105 MMOL/L
CO2 SERPL-SCNC: 20 MMOL/L
COLOR: NORMAL
CREAT SERPL-MCNC: 0.94 MG/DL
EGFRCR SERPLBLD CKD-EPI 2021: 87 ML/MIN/1.73M2
EPITHELIAL CELLS: 0 /HPF
GLUCOSE QUALITATIVE U: >=1000 MG/DL
GLUCOSE SERPL-MCNC: 119 MG/DL
KETONES URINE: NEGATIVE MG/DL
LEUKOCYTE ESTERASE URINE: NEGATIVE
MICROSCOPIC-UA: NORMAL
NITRITE URINE: NEGATIVE
PH URINE: 5.5
POTASSIUM SERPL-SCNC: 4.3 MMOL/L
PROTEIN URINE: 100 MG/DL
PSA FREE FLD-MCNC: 32 %
PSA FREE SERPL-MCNC: 2.62 NG/ML
PSA SERPL-MCNC: 8.1 NG/ML
RED BLOOD CELLS URINE: 2 /HPF
SODIUM SERPL-SCNC: 143 MMOL/L
SPECIFIC GRAVITY URINE: 1.03
UROBILINOGEN URINE: 0.2 MG/DL
WHITE BLOOD CELLS URINE: 0 /HPF

## 2025-07-17 ENCOUNTER — NON-APPOINTMENT (OUTPATIENT)
Age: 70
End: 2025-07-17

## 2025-07-19 LAB — BACTERIA UR CULT: NORMAL

## 2025-08-08 ENCOUNTER — NON-APPOINTMENT (OUTPATIENT)
Age: 70
End: 2025-08-08